# Patient Record
Sex: MALE | Race: BLACK OR AFRICAN AMERICAN | Employment: FULL TIME | URBAN - METROPOLITAN AREA
[De-identification: names, ages, dates, MRNs, and addresses within clinical notes are randomized per-mention and may not be internally consistent; named-entity substitution may affect disease eponyms.]

---

## 2018-06-16 PROBLEM — N41.1 CHRONIC PROSTATITIS: Status: ACTIVE | Noted: 2018-06-16

## 2018-06-16 PROBLEM — E78.00 HYPERCHOLESTEROLEMIA: Status: ACTIVE | Noted: 2018-06-16

## 2018-06-16 PROBLEM — R73.02 IGT (IMPAIRED GLUCOSE TOLERANCE): Status: ACTIVE | Noted: 2018-06-16

## 2018-06-16 PROBLEM — E66.3 OVERWEIGHT (BMI 25.0-29.9): Status: ACTIVE | Noted: 2018-06-16

## 2018-06-16 PROBLEM — R97.20 ELEVATED PSA, LESS THAN 10 NG/ML: Status: ACTIVE | Noted: 2018-06-16

## 2020-06-19 ENCOUNTER — ANESTHESIA EVENT (OUTPATIENT)
Dept: SURGERY | Age: 55
End: 2020-06-19
Payer: OTHER MISCELLANEOUS

## 2020-06-19 ENCOUNTER — HOSPITAL ENCOUNTER (OUTPATIENT)
Age: 55
Setting detail: OUTPATIENT SURGERY
Discharge: HOME | End: 2020-06-19
Attending: ORTHOPAEDIC SURGERY | Admitting: ORTHOPAEDIC SURGERY
Payer: OTHER MISCELLANEOUS

## 2020-06-19 ENCOUNTER — ANESTHESIA (OUTPATIENT)
Dept: SURGERY | Age: 55
End: 2020-06-19
Payer: OTHER MISCELLANEOUS

## 2020-06-19 VITALS
BODY MASS INDEX: 29.48 KG/M2 | WEIGHT: 229.6 LBS | RESPIRATION RATE: 16 BRPM | OXYGEN SATURATION: 98 % | TEMPERATURE: 97.7 F | SYSTOLIC BLOOD PRESSURE: 126 MMHG | DIASTOLIC BLOOD PRESSURE: 78 MMHG | HEART RATE: 55 BPM

## 2020-06-19 PROCEDURE — C1713 ANCHOR/SCREW BN/BN,TIS/BN: HCPCS | Performed by: ORTHOPAEDIC SURGERY

## 2020-06-19 PROCEDURE — 76010000160 HC OR TIME 0.5 TO 1 HR INTENSV-TIER 1: Performed by: ORTHOPAEDIC SURGERY

## 2020-06-19 PROCEDURE — 76210000020 HC REC RM PH II FIRST 0.5 HR: Performed by: ORTHOPAEDIC SURGERY

## 2020-06-19 PROCEDURE — 77030002933 HC SUT MCRYL J&J -A: Performed by: ORTHOPAEDIC SURGERY

## 2020-06-19 PROCEDURE — 74011250636 HC RX REV CODE- 250/636: Performed by: ORTHOPAEDIC SURGERY

## 2020-06-19 PROCEDURE — 77030019605: Performed by: ORTHOPAEDIC SURGERY

## 2020-06-19 PROCEDURE — 77030010509 HC AIRWY LMA MSK TELE -A: Performed by: ANESTHESIOLOGY

## 2020-06-19 PROCEDURE — 77030002960 HC SUT PASS S&N -C: Performed by: ORTHOPAEDIC SURGERY

## 2020-06-19 PROCEDURE — 74011250636 HC RX REV CODE- 250/636: Performed by: ANESTHESIOLOGY

## 2020-06-19 PROCEDURE — 77030041183 HC KT ACCESS POS ACUFX SN -B: Performed by: ORTHOPAEDIC SURGERY

## 2020-06-19 PROCEDURE — 77030027384 HC PRB ARTHSCP SERFAS STRY -C: Performed by: ORTHOPAEDIC SURGERY

## 2020-06-19 PROCEDURE — 74011250636 HC RX REV CODE- 250/636: Performed by: NURSE ANESTHETIST, CERTIFIED REGISTERED

## 2020-06-19 PROCEDURE — 74011250636 HC RX REV CODE- 250/636: Performed by: PHYSICIAN ASSISTANT

## 2020-06-19 PROCEDURE — 77030033005 HC TBNG ARTHSC PMP STRY -B: Performed by: ORTHOPAEDIC SURGERY

## 2020-06-19 PROCEDURE — 77030018836 HC SOL IRR NACL ICUM -A: Performed by: ORTHOPAEDIC SURGERY

## 2020-06-19 PROCEDURE — 77030040361 HC SLV COMPR DVT MDII -B: Performed by: ORTHOPAEDIC SURGERY

## 2020-06-19 PROCEDURE — 76010010054 HC POST OP PAIN BLOCK: Performed by: ORTHOPAEDIC SURGERY

## 2020-06-19 PROCEDURE — 74011000250 HC RX REV CODE- 250: Performed by: NURSE ANESTHETIST, CERTIFIED REGISTERED

## 2020-06-19 PROCEDURE — 77030006668 HC BLD SHV MENSCS STRY -B: Performed by: ORTHOPAEDIC SURGERY

## 2020-06-19 PROCEDURE — 76060000034 HC ANESTHESIA 1.5 TO 2 HR: Performed by: ORTHOPAEDIC SURGERY

## 2020-06-19 PROCEDURE — 76210000006 HC OR PH I REC 0.5 TO 1 HR: Performed by: ORTHOPAEDIC SURGERY

## 2020-06-19 PROCEDURE — 77030003666 HC NDL SPINAL BD -A: Performed by: ORTHOPAEDIC SURGERY

## 2020-06-19 PROCEDURE — 76942 ECHO GUIDE FOR BIOPSY: CPT | Performed by: ORTHOPAEDIC SURGERY

## 2020-06-19 PROCEDURE — 74011000250 HC RX REV CODE- 250: Performed by: ANESTHESIOLOGY

## 2020-06-19 PROCEDURE — 77030003602 HC NDL NRV BLK BBMI -B: Performed by: ANESTHESIOLOGY

## 2020-06-19 PROCEDURE — 74011250637 HC RX REV CODE- 250/637: Performed by: ANESTHESIOLOGY

## 2020-06-19 PROCEDURE — 77030004453 HC BUR SHV STRY -B: Performed by: ORTHOPAEDIC SURGERY

## 2020-06-19 PROCEDURE — 74011000250 HC RX REV CODE- 250: Performed by: PHYSICIAN ASSISTANT

## 2020-06-19 DEVICE — OMEGA 4.75MM PEEK KNOTLESS ANCHOR SYSTEM, SINGLE
Type: IMPLANTABLE DEVICE | Site: SHOULDER | Status: FUNCTIONAL
Brand: OMEGA

## 2020-06-19 RX ORDER — ONDANSETRON 2 MG/ML
INJECTION INTRAMUSCULAR; INTRAVENOUS AS NEEDED
Status: DISCONTINUED | OUTPATIENT
Start: 2020-06-19 | End: 2020-06-19 | Stop reason: HOSPADM

## 2020-06-19 RX ORDER — MIDAZOLAM HYDROCHLORIDE 1 MG/ML
2 INJECTION, SOLUTION INTRAMUSCULAR; INTRAVENOUS ONCE
Status: COMPLETED | OUTPATIENT
Start: 2020-06-19 | End: 2020-06-19

## 2020-06-19 RX ORDER — EPHEDRINE SULFATE/0.9% NACL/PF 50 MG/5 ML
SYRINGE (ML) INTRAVENOUS AS NEEDED
Status: DISCONTINUED | OUTPATIENT
Start: 2020-06-19 | End: 2020-06-19 | Stop reason: HOSPADM

## 2020-06-19 RX ORDER — FENTANYL CITRATE 50 UG/ML
100 INJECTION, SOLUTION INTRAMUSCULAR; INTRAVENOUS ONCE
Status: COMPLETED | OUTPATIENT
Start: 2020-06-19 | End: 2020-06-19

## 2020-06-19 RX ORDER — LIDOCAINE HYDROCHLORIDE 10 MG/ML
0.1 INJECTION INFILTRATION; PERINEURAL AS NEEDED
Status: DISCONTINUED | OUTPATIENT
Start: 2020-06-19 | End: 2020-06-19 | Stop reason: HOSPADM

## 2020-06-19 RX ORDER — OXYCODONE HYDROCHLORIDE 5 MG/1
5 TABLET ORAL
Status: DISCONTINUED | OUTPATIENT
Start: 2020-06-19 | End: 2020-06-19 | Stop reason: HOSPADM

## 2020-06-19 RX ORDER — MIDAZOLAM HYDROCHLORIDE 1 MG/ML
2 INJECTION, SOLUTION INTRAMUSCULAR; INTRAVENOUS ONCE
Status: DISCONTINUED | OUTPATIENT
Start: 2020-06-19 | End: 2020-06-19 | Stop reason: HOSPADM

## 2020-06-19 RX ORDER — GLYCOPYRROLATE 0.2 MG/ML
INJECTION INTRAMUSCULAR; INTRAVENOUS AS NEEDED
Status: DISCONTINUED | OUTPATIENT
Start: 2020-06-19 | End: 2020-06-19 | Stop reason: HOSPADM

## 2020-06-19 RX ORDER — OXYCODONE HYDROCHLORIDE 5 MG/1
10 TABLET ORAL
Status: DISCONTINUED | OUTPATIENT
Start: 2020-06-19 | End: 2020-06-19 | Stop reason: HOSPADM

## 2020-06-19 RX ORDER — SODIUM CHLORIDE, SODIUM LACTATE, POTASSIUM CHLORIDE, CALCIUM CHLORIDE 600; 310; 30; 20 MG/100ML; MG/100ML; MG/100ML; MG/100ML
75 INJECTION, SOLUTION INTRAVENOUS CONTINUOUS
Status: DISCONTINUED | OUTPATIENT
Start: 2020-06-19 | End: 2020-06-19 | Stop reason: HOSPADM

## 2020-06-19 RX ORDER — BUPIVACAINE HYDROCHLORIDE AND EPINEPHRINE 5; 5 MG/ML; UG/ML
INJECTION, SOLUTION EPIDURAL; INTRACAUDAL; PERINEURAL
Status: COMPLETED | OUTPATIENT
Start: 2020-06-19 | End: 2020-06-19

## 2020-06-19 RX ORDER — CEFAZOLIN SODIUM/WATER 2 G/20 ML
2 SYRINGE (ML) INTRAVENOUS ONCE
Status: COMPLETED | OUTPATIENT
Start: 2020-06-19 | End: 2020-06-19

## 2020-06-19 RX ORDER — FAMOTIDINE 20 MG/1
20 TABLET, FILM COATED ORAL ONCE
Status: COMPLETED | OUTPATIENT
Start: 2020-06-19 | End: 2020-06-19

## 2020-06-19 RX ORDER — DEXAMETHASONE SODIUM PHOSPHATE 4 MG/ML
INJECTION, SOLUTION INTRA-ARTICULAR; INTRALESIONAL; INTRAMUSCULAR; INTRAVENOUS; SOFT TISSUE AS NEEDED
Status: DISCONTINUED | OUTPATIENT
Start: 2020-06-19 | End: 2020-06-19 | Stop reason: HOSPADM

## 2020-06-19 RX ORDER — HYDROMORPHONE HYDROCHLORIDE 2 MG/ML
0.5 INJECTION, SOLUTION INTRAMUSCULAR; INTRAVENOUS; SUBCUTANEOUS
Status: DISCONTINUED | OUTPATIENT
Start: 2020-06-19 | End: 2020-06-19 | Stop reason: HOSPADM

## 2020-06-19 RX ORDER — EPINEPHRINE 1 MG/ML
INJECTION INTRAMUSCULAR; INTRAVENOUS; SUBCUTANEOUS AS NEEDED
Status: DISCONTINUED | OUTPATIENT
Start: 2020-06-19 | End: 2020-06-19 | Stop reason: HOSPADM

## 2020-06-19 RX ORDER — PROPOFOL 10 MG/ML
INJECTION, EMULSION INTRAVENOUS AS NEEDED
Status: DISCONTINUED | OUTPATIENT
Start: 2020-06-19 | End: 2020-06-19 | Stop reason: HOSPADM

## 2020-06-19 RX ORDER — LORAZEPAM 2 MG/ML
INJECTION INTRAMUSCULAR
Status: DISCONTINUED
Start: 2020-06-19 | End: 2020-06-19 | Stop reason: WASHOUT

## 2020-06-19 RX ORDER — IBUPROFEN 400 MG/1
400 TABLET ORAL ONCE
Status: DISCONTINUED | OUTPATIENT
Start: 2020-06-19 | End: 2020-06-19 | Stop reason: HOSPADM

## 2020-06-19 RX ORDER — LIDOCAINE HYDROCHLORIDE 20 MG/ML
INJECTION, SOLUTION EPIDURAL; INFILTRATION; INTRACAUDAL; PERINEURAL AS NEEDED
Status: DISCONTINUED | OUTPATIENT
Start: 2020-06-19 | End: 2020-06-19 | Stop reason: HOSPADM

## 2020-06-19 RX ADMIN — MIDAZOLAM 1 MG: 1 INJECTION INTRAMUSCULAR; INTRAVENOUS at 12:25

## 2020-06-19 RX ADMIN — CEFAZOLIN SODIUM 2 G: 100 INJECTION, POWDER, LYOPHILIZED, FOR SOLUTION INTRAVENOUS at 12:44

## 2020-06-19 RX ADMIN — LIDOCAINE HYDROCHLORIDE 100 MG: 20 INJECTION, SOLUTION EPIDURAL; INFILTRATION; INTRACAUDAL; PERINEURAL at 12:40

## 2020-06-19 RX ADMIN — GLYCOPYRROLATE 0.2 MG: 0.2 INJECTION, SOLUTION INTRAMUSCULAR; INTRAVENOUS at 13:17

## 2020-06-19 RX ADMIN — Medication 10 MG: at 13:13

## 2020-06-19 RX ADMIN — ONDANSETRON 4 MG: 2 INJECTION INTRAMUSCULAR; INTRAVENOUS at 12:59

## 2020-06-19 RX ADMIN — SODIUM CHLORIDE, SODIUM LACTATE, POTASSIUM CHLORIDE, AND CALCIUM CHLORIDE 75 ML/HR: 600; 310; 30; 20 INJECTION, SOLUTION INTRAVENOUS at 12:11

## 2020-06-19 RX ADMIN — FENTANYL CITRATE 50 MCG: 50 INJECTION, SOLUTION INTRAMUSCULAR; INTRAVENOUS at 12:24

## 2020-06-19 RX ADMIN — BUPIVACAINE HYDROCHLORIDE AND EPINEPHRINE BITARTRATE 30 ML: 5; .005 INJECTION, SOLUTION EPIDURAL; INTRACAUDAL; PERINEURAL at 12:31

## 2020-06-19 RX ADMIN — DEXAMETHASONE SODIUM PHOSPHATE 4 MG: 4 INJECTION, SOLUTION INTRAMUSCULAR; INTRAVENOUS at 13:00

## 2020-06-19 RX ADMIN — PROPOFOL 200 MG: 10 INJECTION, EMULSION INTRAVENOUS at 12:40

## 2020-06-19 RX ADMIN — FAMOTIDINE 20 MG: 20 TABLET, FILM COATED ORAL at 12:11

## 2020-06-19 NOTE — H&P
Outpatient Surgery History and Physical:  Darleen Lazar was seen and examined. CHIEF COMPLAINT:   Right shoulder pain. PE:     Visit Vitals  /76 (BP 1 Location: Left arm, BP Patient Position: At rest)   Pulse 83   Temp 98 °F (36.7 °C)   Resp 16   Wt 104.1 kg (229 lb 9.6 oz)   SpO2 97%   BMI 29.48 kg/m²       Heart:   Regular rhythm      Lungs:  Are clear      Past Medical History:    Patient Active Problem List    Diagnosis    IGT (impaired glucose tolerance)    Elevated PSA, less than 10 ng/ml    Overweight (BMI 25.0-29. 9)    Hypercholesterolemia    Chronic prostatitis       Surgical History:   Past Surgical History:   Procedure Laterality Date    HX ORTHOPAEDIC  late 1990's    \"torn ligament\" \"reconstruction\" both knees       Social History: Patient  reports that he has never smoked. He has never used smokeless tobacco. He reports current alcohol use of about 16.0 standard drinks of alcohol per week. He reports previous drug use. Drug: Marijuana. Family History:   Family History   Problem Relation Age of Onset    Heart Disease Mother        Allergies: Reviewed per EMR  No Known Allergies    Medications:    No current facility-administered medications on file prior to encounter. Current Outpatient Medications on File Prior to Encounter   Medication Sig    multivitamin (ONE A DAY) tablet Take 1 Tab by mouth daily. The surgery is planned for the right shoulder arthroscopy rotator cuff repair. History and physical has been reviewed. The patient has been examined. There have been no significant clinical changes since the completion of the originally dated History and Physical.  Patient identified by surgeon; surgical site was confirmed by patient and surgeon. The patient is here today for outpatient surgery. I have examined the patient, no changes are noted in the patient's medical status.  Necessity for the procedure/care is still present and the history and physical above is current. See the office notes for the full long term history of the problem. Please see the recent office notes for the musculoskeletal examination.     Signed By: ORLY Abdullahi     June 19, 2020 11:33 AM

## 2020-06-19 NOTE — ANESTHESIA PROCEDURE NOTES
Peripheral Block    Start time: 6/19/2020 12:23 PM  End time: 6/19/2020 12:27 PM  Performed by: Jhoana Summers MD  Authorized by: Jhoana Summers MD       Pre-procedure: Indications: at surgeon's request, post-op pain management and procedure for pain    Preanesthetic Checklist: patient identified, risks and benefits discussed, site marked, timeout performed, anesthesia consent given and patient being monitored    Timeout Time: 12:23          Block Type:   Block Type:   Interscalene  Laterality:  Right  Monitoring:  Standard ASA monitoring, continuous pulse ox, frequent vital sign checks, heart rate, oxygen and responsive to questions  Injection Technique:  Single shot  Procedures: ultrasound guided and nerve stimulator    Patient Position: supine  Prep: chlorhexidine    Location:  Interscalene  Needle Type:  Stimuplex  Needle Gauge:  21 G  Needle Localization:  Ultrasound guidance and anatomical landmarks  Motor Response: minimal motor response >0.4 mA      Assessment:  Number of attempts:  1  Injection Assessment:  Incremental injection every 5 mL, local visualized surrounding nerve on ultrasound, negative aspiration for blood, negative aspiration for CSF, no paresthesia, no intravascular symptoms and ultrasound image on chart  Patient tolerance:  Patient tolerated the procedure well with no immediate complications

## 2020-06-19 NOTE — OP NOTES
[address] [place] [address]  OPERATIVE REPORT    Name:  Rodger Pak  MR#:  [id number]  :  1965  ACCOUNT #:  [de-identified]  DATE OF SERVICE:  2020    PREOPERATIVE DIAGNOSES:  1. Rotator cuff tear. 2.  Chondromalacia of glenoid  3. Chondromalacia of humeral head. 4.  Impingement, right shoulder. POSTOPERATIVE DIAGNOSES:  1. Rotator cuff tear. 2.  Chondromalacia of glenoid  3. Chondromalacia of humeral head. 4.  Impingement, right shoulder. PROCEDURE PERFORMED:  1. Arthroscopic rotator cuff repair - CPT [id number].  2.  Chondroplasty of glenoid - extensive debridement - CPT 80909.  3.  Chondroplasty of humeral head - extensive debridement - CPT 99876.  4.  Arthroscopic subacromial decompression - CPT 33469, right shoulder. SURGEON:  Lisa Collado MD    ASSISTANT:  Yarely Patel. [facility]    ANESTHESIA:  General.    COMPLICATIONS:  None. SPECIMENS REMOVED:  None. IMPLANTS:  Yes, see record. ESTIMATED BLOOD LOSS:  Minimal.    FLUIDS:  Crystalloid. FINDINGS:  There was a high-grade partial tear of the supraspinatus encompassing approximately 9-10 mm. This was greater than 50% thickness of the tendon. There was significant chondromalacia of the glenoid and humeral head with large areas of exposed bone on both surfaces. There was some synovitis and some small loose bodies in the glenohumeral joint. There was an anterior-inferior acromial slope. There was no undersurface osteophytes of the distal clavicle or evidence of impingement on the rotator cuff from the distal clavicle. DESCRIPTION OF PROCEDURE:  After informed consent, the patient was brought to the operating room and placed on the operating room table in the supine position. General anesthesia was administered without difficulty. The patient was placed in lateral decubitus position. All pressure points were inspected and padded appropriately. Right upper extremity was suspended by traction.   Right shoulder was prepped and draped in sterile fashion. Glenohumeral joint was insufflated with 50 mL of sterile saline and a posterior portal was established. Glenohumeral joint was then arthroscoped in sequential manner. The aforementioned findings were noted. An anterior portal was established. Synovectomy was performed. All abnormal tissue was removed. Small osteochondral loose bodies were removed from the shoulder joint. A chondroplasty of the glenoid was performed. All loose cartilage flaps were removed. There were no sharp edges or unstable fragments after the chondroplasty. In a similar manner, a chondroplasty of humeral head was performed. All loose cartilage flaps were removed. There were no sharp edges or unstable fragments after the chondroplasty. Undersurface rotator cuff tearing was debrided back to smooth stable area. The high-grade partial tear was marked with a spinal needle. Scope was brought to subacromial space. A lateral portal was established. Bursal proliferative tissue was excised. The undersurface of the acromion was exposed and an acromioplasty was performed. All of the acromion anterior to the leading edge of the distal clavicle was excised. A depth of 5-6 mm in a 2-cm anterior to posterior direction was removed. This opened up the subacromial space nicely. Attention was then directed to the rotator cuff. The high-grade partial tear was converted to a full-thickness tear. The area underneath the original insertion was lightly decorticated. An Omega anchor and two tape sutures were used to anatomically repair the tear in a mattress fashion. Complete repair of the rotator cuff was performed. The rotator cuff tear was repaired without difficulty. Shoulder was thoroughly irrigated. Portals were closed. Dressings were applied. The patient was extubated and taken to the recovery room in stable condition. ORLY Cade assisted during the procedure.   He was necessary for patient positioning, wound closure and assistance with the major portions of the operation,  His presence decreased the operative time and potential complication rate.       MD YUE [email or web address]  D:  06/19/2020 13:45  T:  06/19/2020 14:36  JOB #:  [id number]

## 2020-06-19 NOTE — ANESTHESIA PREPROCEDURE EVALUATION
Relevant Problems   No relevant active problems       Anesthetic History               Review of Systems / Medical History  Patient summary reviewed and pertinent labs reviewed    Pulmonary                   Neuro/Psych              Cardiovascular                  Exercise tolerance: >4 METS     GI/Hepatic/Renal                Endo/Other             Other Findings              Physical Exam    Airway  Mallampati: II  TM Distance: > 6 cm  Neck ROM: normal range of motion   Mouth opening: Normal     Cardiovascular  Regular rate and rhythm,  S1 and S2 normal,  no murmur, click, rub, or gallop  Rhythm: regular  Rate: normal         Dental  No notable dental hx       Pulmonary  Breath sounds clear to auscultation               Abdominal         Other Findings            Anesthetic Plan    ASA: 1  Anesthesia type: general      Post-op pain plan if not by surgeon: peripheral nerve block single      Anesthetic plan and risks discussed with: Patient

## 2020-06-19 NOTE — DISCHARGE INSTRUCTIONS
Post-Operative Instructions   For  Shoulder Arthroscopy Rotator Cuff Repair  Phone:  [phone]    1. If you do not have an \"Iceman\" type cooling unit, for the first 48-72 hours following surgery, use ice on the shoulder every two hours (while awake) for 20-30 minutes at a time to help prevent swelling and lessen pain. If you have a cooling unit, follow the instructions given to you- continually as much as possible the first 48-72 hours, then 3-4 times a day for 4 weeks. 2. You may shower after the arthroscopy. Keep dressings in place for the first three days. After three days, you may remove the dressings and leave the steri-strip bandages in place; they will peel off naturally. 3. Stay in sling at all times except to shower. 4. Begin therapy as ordered. 5. Use any pain medication as instructed. You should take your pain medication as soon as you feel the anesthetic wearing off. Do not wait until you are in severe pain to begin taking your pain medication. 6. You may have some side effects from your pain medication. If you have nausea, try taking your medication with food. For itching, you may take over the counter Benadryl. 7. You may have been given a prescription for Zofran or Phenergan. This medication is used for nausea and vomiting. You do not need to get this prescription filled unless you have a problem. 8. If you have a problem, please call [facility] at [phone]    [address] [place] [address], [facility]     TYPICAL SIDE EFFECTS OF PAIN MEDICATION:  *    Constipation: Drink lots of fluids. Over the counter stool softener if needed. *    Nausea: Take pain medication with food. Call your doctor with persistent nausea. ACTIVITY  · As tolerated and as directed by your doctor. · Bathe or shower as directed by your doctor.      DIET  · Day of surgery: Clear liquids until no nausea or vomiting; small portion, light diet Deb Barrios foods (ex: baked chicken, plain rice, grits, scrambled eggs, toast). Nothing greasy, fried or spicy today. · Advance to regular diet on second day, unless your doctor orders otherwise. · If nausea and vomiting continues, call your doctor. PAIN  · Take pain medication as directed by your doctor. · DO NOT take aspirin or blood thinners unless directed by your doctor. CALL YOUR DOCTOR IF    s Call your doctor if pain is NOT relieved by medication.   s Excessive bleeding that does not stop after holding pressure over the area  · Temperature of 101 degrees F or above  · Excessive redness, swelling or bruising, and/ or green or yellow, smelly discharge from incision    AFTER ANESTHESIA   · For the first 24 hours: DO NOT Drive, Drink alcoholic beverages, or Make important decisions. · Be aware of dizziness following anesthesia and while taking pain medication. DISCHARGE SUMMARY from Nurse    PATIENT INSTRUCTIONS:    After general anesthesia or intravenous sedation, for 24 hours or while taking prescription Narcotics:  · Limit your activities  · Do not drive and operate hazardous machinery  · Do not make important personal or business decisions  · Do  not drink alcoholic beverages  · If you have not urinated within 8 hours after discharge, please contact your surgeon on call. *  Please give a list of your current medications to your Primary Care Provider. *  Please update this list whenever your medications are discontinued, doses are      changed, or new medications (including over-the-counter products) are added. *  Please carry medication information at all times in case of emergency situations. Preventing Infection at Home  We care about preventing infection and avoiding the spread of germs - not only when you are in the hospital but also when you return home.  When you return home from the hospital, its important to take the following steps to help prevent infection and avoid spreading germs that could infect you and others. Ask everyone in your home to follow these guidelines, too. Clean Your Hands  · Clean your hands whenever your hands are visibly dirty, before you eat, before or after touching your mouth, nose or eyes, and before preparing food. Clean them after contact with body fluids, using the restroom, touching animals or changing diapers. · When washing hands, wet them with warm water and work up a lather. Rub hands for at least 15 seconds, then rinse them and pat them dry with a clean towel or paper towel. · When using hand sanitizers, it should take about 15 seconds to rub your hands dry. If not, you probably didnt apply enough . Cover Your Sneeze or Cough  Germs are released into the air whenever you sneeze or cough. To prevent the spread of infection:  · Turn away from other people before coughing or sneezing. · Cover your mouth or nose with a tissue when you cough or sneeze. Put the tissue in the trash. · If you dont have a tissue, cough or sneeze into your upper sleeve, not your hands. · Always clean your hands after coughing or sneezing. Care for Wounds  Your skin is your bodys first line of defense against germs, but an open wound leaves an easy way for germs to enter your body. To prevent infection:  · Clean your hands before and after changing wound dressings, and wear gloves to change dressings if recommended by your doctor. · Take special care with IV lines or other devices inserted into the body. If you must touch them, clean your hands first.  · Follow any specific instructions from your doctor to care for your wounds. Contact your doctor if you experience any signs of infection, such as fever or increased redness at the surgical or wound site. Keep a Clean Home  · Clean or wipe commonly touched hard surfaces like door handles, sinks, tabletops, phones and TV remotes.   · Use products labeled disinfectant to kill harmful bacteria and viruses. · Use a clean cloth or paper towel to clean and dry surfaces. Wiping surfaces with a dirty dishcloth, sponge or towel will only spread germs. · Never share toothbrushes, lazo, drinking glasses, utensils, razor blades, face cloths or bath towels to avoid spreading germs. · Be sure that the linens that you sleep on are clean. · Keep pets away from wounds and wash your hands after touching pets, their toys or bedding. We care about you and your health. Remember, preventing infections is a team effort between you, your family, friends and health care providers. These are general instructions for a healthy lifestyle:    No smoking/ No tobacco products/ Avoid exposure to second hand smoke    Surgeon General's Warning:  Quitting smoking now greatly reduces serious risk to your health. Obesity, smoking, and sedentary lifestyle greatly increases your risk for illness    A healthy diet, regular physical exercise & weight monitoring are important for maintaining a healthy lifestyle    You may be retaining fluid if you have a history of heart failure or if you experience any of the following symptoms:  Weight gain of 3 pounds or more overnight or 5 pounds in a week, increased swelling in our hands or feet or shortness of breath while lying flat in bed. Please call your doctor as soon as you notice any of these symptoms; do not wait until your next office visit. Recognize signs and symptoms of STROKE:    F-face looks uneven    A-arms unable to move or move unevenly    S-speech slurred or non-existent    T-time-call 911 as soon as signs and symptoms begin-DO NOT go       Back to bed or wait to see if you get better-TIME IS BRAIN. Advance Care Planning  People with COVID-19 may have no symptoms, mild symptoms, such as fever, cough, and shortness of breath or they may have more severe illness, developing severe and fatal pneumonia.   As a result, Advance Care Planning with attention to naming a health care decision maker (someone you trust to make healthcare decisions for you if you could not speak for yourself) and sharing other health care preferences is important BEFORE a possible health crisis. Please contact your Primary Care Provider to discuss Advance Care Planning. Preventing the Spread of Coronavirus Disease 2019 in Homes and Residential Communities  For the most recent information go to [employer]    Prevention steps for People with confirmed or suspected COVID-19 (including persons under investigation) who do not need to be hospitalized  and   People with confirmed COVID-19 who were hospitalized and determined to be medically stable to go home    Your healthcare provider and public health staff will evaluate whether you can be cared for at home. If it is determined that you do not need to be hospitalized and can be isolated at home, you will be monitored by staff from your local or state health department. You should follow the prevention steps below until a healthcare provider or local or state health department says you can return to your normal activities. Stay home except to get medical care  People who are mildly ill with COVID-19 are able to isolate at home during their illness. You should restrict activities outside your home, except for getting medical care. Do not go to work, school, or public areas. Avoid using public transportation, ride-sharing, or taxis. Separate yourself from other people and animals in your home  People: As much as possible, you should stay in a specific room and away from other people in your home. Also, you should use a separate bathroom, if available. Animals: You should restrict contact with pets and other animals while you are sick with COVID-19, just like you would around other people.  Although there have not been reports of pets or other animals becoming sick with COVID-19, it is still recommended that people sick with COVID-19 limit contact with animals until more information is known about the virus. When possible, have another member of your household care for your animals while you are sick. If you are sick with COVID-19, avoid contact with your pet, including petting, snuggling, being kissed or licked, and sharing food. If you must care for your pet or be around animals while you are sick, wash your hands before and after you interact with pets and wear a facemask. Call ahead before visiting your doctor  If you have a medical appointment, call the healthcare provider and tell them that you have or may have COVID-19. This will help the healthcare providers office take steps to keep other people from getting infected or exposed. Wear a facemask  You should wear a facemask when you are around other people (e.g., sharing a room or vehicle) or pets and before you enter a healthcare providers office. If you are not able to wear a facemask (for example, because it causes trouble breathing), then people who live with you should not stay in the same room with you, or they should wear a facemask if they enter your room. Cover your coughs and sneezes  Cover your mouth and nose with a tissue when you cough or sneeze. Throw used tissues in a lined trash can. Immediately wash your hands with soap and water for at least 20 seconds or, if soap and water are not available, clean your hands with an alcohol-based hand  that contains at least 60% alcohol. Clean your hands often  Wash your hands often with soap and water for at least 20 seconds, especially after blowing your nose, coughing, or sneezing; going to the bathroom; and before eating or preparing food. If soap and water are not readily available, use an alcohol-based hand  with at least 60% alcohol, covering all surfaces of your hands and rubbing them together until they feel dry.   Soap and water are the best option if hands are visibly dirty. Avoid touching your eyes, nose, and mouth with unwashed hands. Avoid sharing personal household items  You should not share dishes, drinking glasses, cups, eating utensils, towels, or bedding with other people or pets in your home. After using these items, they should be washed thoroughly with soap and water. Clean all high-touch surfaces everyday  High touch surfaces include counters, tabletops, doorknobs, bathroom fixtures, toilets, phones, keyboards, tablets, and bedside tables. Also, clean any surfaces that may have blood, stool, or body fluids on them. Use a household cleaning spray or wipe, according to the label instructions. Labels contain instructions for safe and effective use of the cleaning product including precautions you should take when applying the product, such as wearing gloves and making sure you have good ventilation during use of the product. Monitor your symptoms  Seek prompt medical attention if your illness is worsening (e.g., difficulty breathing). Before seeking care, call your healthcare provider and tell them that you have, or are being evaluated for, COVID-19. Put on a facemask before you enter the facility. These steps will help the healthcare providers office to keep other people in the office or waiting room from getting infected or exposed. Ask your healthcare provider to call the local or state health department. Persons who are placed under active monitoring or facilitated self-monitoring should follow instructions provided by their local health department or occupational health professionals, as appropriate. When working with your local health department check their available hours. If you have a medical emergency and need to call 911, notify the dispatch personnel that you have, or are being evaluated for COVID-19. If possible, put on a facemask before emergency medical services arrive.   Discontinuing home isolation  Patients with confirmed COVID-19 should remain under home isolation precautions until the risk of secondary transmission to others is thought to be low. The decision to discontinue home isolation precautions should be made on a case-by-case basis, in consultation with healthcare providers and state and local health departments.

## 2020-06-19 NOTE — ANESTHESIA POSTPROCEDURE EVALUATION
Procedure(s):  RIGHT SHOULDER ARTHROSCOPY ROTATOR CUFF REPAIR GEN/SCAL.     general    Anesthesia Post Evaluation      Multimodal analgesia: multimodal analgesia not used between 6 hours prior to anesthesia start to PACU discharge  Patient location during evaluation: PACU  Patient participation: complete - patient participated  Level of consciousness: awake and alert  Pain management: adequate  Airway patency: patent  Anesthetic complications: no  Cardiovascular status: hemodynamically stable  Respiratory status: acceptable  Hydration status: acceptable        INITIAL Post-op Vital signs:   Vitals Value Taken Time   /81 6/19/2020  2:43 PM   Temp 36.4 °C (97.5 °F) 6/19/2020  2:10 PM   Pulse 51 6/19/2020  2:43 PM   Resp 15 6/19/2020  2:28 PM   SpO2 96 % 6/19/2020  2:43 PM

## 2020-06-19 NOTE — PERIOP NOTES
PACU DISCHARGE NOTE  Patient complaint of headache. Dr Cherene Kehr aware and gave new orders. See MAR. Patient declined medication for headache. Vital signs stable, pain well controlled, alert and oriented times three or at baseline, no anesthetic complications. IV removed with catheter tip intact. Written and verbal discharge instructions given, including pain control, dressing care and follow up appointment. Spouse, Mandie Hairston verbalized understanding and signed discharge instructions. All questions answered prior to discharge. Dr Ravinder Abreu okay to discharge at this time. Pt and all belongings taken via wheelchair and safely put in vehicle.

## 2020-08-22 ENCOUNTER — HOSPITAL ENCOUNTER (EMERGENCY)
Age: 55
Discharge: HOME | End: 2020-08-22
Attending: EMERGENCY MEDICINE

## 2020-08-22 ENCOUNTER — APPOINTMENT (OUTPATIENT)
Dept: GENERAL RADIOLOGY | Age: 55
End: 2020-08-22
Attending: EMERGENCY MEDICINE

## 2020-08-22 VITALS
BODY MASS INDEX: 28.6 KG/M2 | HEART RATE: 61 BPM | HEIGHT: 75 IN | TEMPERATURE: 99 F | WEIGHT: 230 LBS | SYSTOLIC BLOOD PRESSURE: 126 MMHG | OXYGEN SATURATION: 98 % | RESPIRATION RATE: 16 BRPM | DIASTOLIC BLOOD PRESSURE: 75 MMHG

## 2020-08-22 DIAGNOSIS — R11.2 NON-INTRACTABLE VOMITING WITH NAUSEA, UNSPECIFIED VOMITING TYPE: Primary | ICD-10-CM

## 2020-08-22 LAB
ALBUMIN SERPL-MCNC: 4.3 G/DL (ref 3.5–5)
ALBUMIN/GLOB SERPL: 1 {RATIO} (ref 1.2–3.5)
ALP SERPL-CCNC: 93 U/L (ref 50–136)
ALT SERPL-CCNC: 36 U/L (ref 12–65)
ANION GAP SERPL CALC-SCNC: 6 MMOL/L (ref 7–16)
AST SERPL-CCNC: 16 U/L (ref 15–37)
BASOPHILS # BLD: 0.1 K/UL (ref 0–0.2)
BASOPHILS NFR BLD: 1 % (ref 0–2)
BILIRUB SERPL-MCNC: 0.7 MG/DL (ref 0.2–1.1)
BUN SERPL-MCNC: 15 MG/DL (ref 6–23)
CALCIUM SERPL-MCNC: 10.5 MG/DL (ref 8.3–10.4)
CHLORIDE SERPL-SCNC: 107 MMOL/L (ref 98–107)
CO2 SERPL-SCNC: 27 MMOL/L (ref 21–32)
CREAT SERPL-MCNC: 1.29 MG/DL (ref 0.8–1.5)
DIFFERENTIAL METHOD BLD: NORMAL
EOSINOPHIL # BLD: 0.1 K/UL (ref 0–0.8)
EOSINOPHIL NFR BLD: 1 % (ref 0.5–7.8)
ERYTHROCYTE [DISTWIDTH] IN BLOOD BY AUTOMATED COUNT: 13.3 % (ref 11.9–14.6)
GLOBULIN SER CALC-MCNC: 4.3 G/DL (ref 2.3–3.5)
GLUCOSE SERPL-MCNC: 108 MG/DL (ref 65–100)
HCT VFR BLD AUTO: 48.7 % (ref 41.1–50.3)
HGB BLD-MCNC: 16.3 G/DL (ref 13.6–17.2)
IMM GRANULOCYTES # BLD AUTO: 0 K/UL (ref 0–0.5)
IMM GRANULOCYTES NFR BLD AUTO: 0 % (ref 0–5)
LIPASE SERPL-CCNC: 181 U/L (ref 73–393)
LYMPHOCYTES # BLD: 1.6 K/UL (ref 0.5–4.6)
LYMPHOCYTES NFR BLD: 22 % (ref 13–44)
MCH RBC QN AUTO: 29.8 PG (ref 26.1–32.9)
MCHC RBC AUTO-ENTMCNC: 33.5 G/DL (ref 31.4–35)
MCV RBC AUTO: 89 FL (ref 79.6–97.8)
MONOCYTES # BLD: 0.6 K/UL (ref 0.1–1.3)
MONOCYTES NFR BLD: 9 % (ref 4–12)
NEUTS SEG # BLD: 4.6 K/UL (ref 1.7–8.2)
NEUTS SEG NFR BLD: 66 % (ref 43–78)
NRBC # BLD: 0 K/UL (ref 0–0.2)
PLATELET # BLD AUTO: 330 K/UL (ref 150–450)
PMV BLD AUTO: 10.1 FL (ref 9.4–12.3)
POTASSIUM SERPL-SCNC: 3.6 MMOL/L (ref 3.5–5.1)
PROT SERPL-MCNC: 8.6 G/DL (ref 6.3–8.2)
RBC # BLD AUTO: 5.47 M/UL (ref 4.23–5.6)
SODIUM SERPL-SCNC: 140 MMOL/L (ref 136–145)
WBC # BLD AUTO: 6.9 K/UL (ref 4.3–11.1)

## 2020-08-22 PROCEDURE — 74022 RADEX COMPL AQT ABD SERIES: CPT

## 2020-08-22 PROCEDURE — 83690 ASSAY OF LIPASE: CPT

## 2020-08-22 PROCEDURE — 74011250636 HC RX REV CODE- 250/636: Performed by: EMERGENCY MEDICINE

## 2020-08-22 PROCEDURE — 74011000250 HC RX REV CODE- 250: Performed by: EMERGENCY MEDICINE

## 2020-08-22 PROCEDURE — 96374 THER/PROPH/DIAG INJ IV PUSH: CPT

## 2020-08-22 PROCEDURE — 80053 COMPREHEN METABOLIC PANEL: CPT

## 2020-08-22 PROCEDURE — 96375 TX/PRO/DX INJ NEW DRUG ADDON: CPT

## 2020-08-22 PROCEDURE — 99282 EMERGENCY DEPT VISIT SF MDM: CPT

## 2020-08-22 PROCEDURE — 85025 COMPLETE CBC W/AUTO DIFF WBC: CPT

## 2020-08-22 RX ORDER — FAMOTIDINE 40 MG/1
40 TABLET, FILM COATED ORAL DAILY
Qty: 30 TAB | Refills: 0 | Status: SHIPPED | OUTPATIENT
Start: 2020-08-22 | End: 2021-08-20

## 2020-08-22 RX ORDER — DICYCLOMINE HYDROCHLORIDE 10 MG/1
10 CAPSULE ORAL 4 TIMES DAILY
Qty: 20 CAP | Refills: 0 | Status: SHIPPED | OUTPATIENT
Start: 2020-08-22 | End: 2020-08-27

## 2020-08-22 RX ORDER — ONDANSETRON 4 MG/1
4 TABLET, ORALLY DISINTEGRATING ORAL
Qty: 12 TAB | Refills: 0 | Status: SHIPPED | OUTPATIENT
Start: 2020-08-22 | End: 2022-05-13

## 2020-08-22 RX ORDER — PROMETHAZINE HYDROCHLORIDE 25 MG/1
25 TABLET ORAL
Qty: 12 TAB | Refills: 0 | Status: SHIPPED | OUTPATIENT
Start: 2020-08-22 | End: 2022-05-13

## 2020-08-22 RX ORDER — ONDANSETRON 2 MG/ML
4 INJECTION INTRAMUSCULAR; INTRAVENOUS ONCE
Status: COMPLETED | OUTPATIENT
Start: 2020-08-22 | End: 2020-08-22

## 2020-08-22 RX ADMIN — SODIUM CHLORIDE 1000 ML: 900 INJECTION, SOLUTION INTRAVENOUS at 12:07

## 2020-08-22 RX ADMIN — FAMOTIDINE 40 MG: 10 INJECTION INTRAVENOUS at 12:07

## 2020-08-22 RX ADMIN — ONDANSETRON 4 MG: 2 INJECTION INTRAMUSCULAR; INTRAVENOUS at 12:06

## 2020-08-22 NOTE — ED NOTES
I have reviewed discharge instructions with the patient. The patient verbalized understanding. Patient left ED via Discharge Method: ambulatory to Home with spouse  Opportunity for questions and clarification provided. Patient given 4 scripts. No e-sign        To continue your aftercare when you leave the hospital, you may receive an automated call from our care team to check in on how you are doing. This is a free service and part of our promise to provide the best care and service to meet your aftercare needs.  If you have questions, or wish to unsubscribe from this service please call [phone]. Thank you for Choosing our [facility] Emergency Department.

## 2020-08-22 NOTE — ED PROVIDER NOTES
The history is provided by the patient. Abdominal Pain    This is a new problem. The current episode started more than 2 days ago. The problem occurs constantly. The problem has not changed since onset. The pain is associated with previous surgery. The pain is located in the generalized abdominal region. The quality of the pain is aching. The pain is at a severity of 3/10. The pain is mild. Associated symptoms include nausea and vomiting. Pertinent negatives include no anorexia, no fever, no belching, no diarrhea, no flatus, no hematochezia, no melena, no constipation, no dysuria, no frequency, no hematuria, no headaches, no arthralgias, no myalgias, no trauma, no chest pain, no testicular pain and no back pain. Nothing worsens the pain. The pain is relieved by nothing. Past workup includes no CT scan, no ultrasound, no surgery, no esophagogastroduodenoscopy, no UGI, no colonoscopy and no barium enema. His past medical history does not include PUD, gallstones, GERD, ulcerative colitis, Crohn's disease, irritable bowel syndrome, cancer, UTI, pancreatitis, ectopic pregnancy, ovarian cysts, diverticulitis, atrial fibrillation, DM, kidney stones or small bowel obstruction. The patient's surgical history non-contributory.        Past Medical History:   Diagnosis Date    Arthritis        Past Surgical History:   Procedure Laterality Date    HX ORTHOPAEDIC  late 1990's    \"torn ligament\" \"reconstruction\" both knees         Family History:   Problem Relation Age of Onset    Heart Disease Mother        Social History     Socioeconomic History    Marital status:      Spouse name: Not on file    Number of children: Not on file    Years of education: Not on file    Highest education level: Not on file   Occupational History    Not on file   Social Needs    Financial resource strain: Not on file    Food insecurity     Worry: Not on file     Inability: Not on file    Transportation needs     Medical: Not on file Non-medical: Not on file   Tobacco Use    Smoking status: Never Smoker    Smokeless tobacco: Never Used   Substance and Sexual Activity    Alcohol use: Yes     Alcohol/week: 16.0 standard drinks     Types: 14 Cans of beer, 2 Shots of liquor per week    Drug use: Not Currently     Types: Marijuana     Comment: last use approximately one month ago    Sexual activity: Not on file   Lifestyle    Physical activity     Days per week: Not on file     Minutes per session: Not on file    Stress: Not on file   Relationships    Social connections     Talks on phone: Not on file     Gets together: Not on file     Attends [employer] service: Not on file     Active member of club or organization: Not on file     Attends meetings of clubs or organizations: Not on file     Relationship status: Not on file    Intimate partner violence     Fear of current or ex partner: Not on file     Emotionally abused: Not on file     Physically abused: Not on file     Forced sexual activity: Not on file   Other Topics Concern    Not on file   Social History Narrative    Not on file         ALLERGIES: Patient has no known allergies. Review of Systems   Constitutional: Negative for fever. Cardiovascular: Negative for chest pain. Gastrointestinal: Positive for abdominal pain, nausea and vomiting. Negative for anorexia, constipation, diarrhea, flatus, hematochezia and melena. Genitourinary: Negative for dysuria, frequency, hematuria and testicular pain. Musculoskeletal: Negative for arthralgias, back pain and myalgias. Neurological: Negative for headaches. All other systems reviewed and are negative. Vitals:    08/22/20 1155   BP: 118/82   Resp: 16   Temp: 99 °F (37.2 °C)   SpO2: 98%   Weight: 104.3 kg (230 lb)   Height: 6' 3\" (1.905 m)            Physical Exam  Vitals signs and nursing note reviewed. Constitutional:       Appearance: He is well-developed. HENT:      Head: Normocephalic and atraumatic.    Eyes: Conjunctiva/sclera: Conjunctivae normal.      Pupils: Pupils are equal, round, and reactive to light. Neck:      Musculoskeletal: Normal range of motion and neck supple. Cardiovascular:      Rate and Rhythm: Normal rate and regular rhythm. Heart sounds: Normal heart sounds. Pulmonary:      Effort: Pulmonary effort is normal.      Breath sounds: Normal breath sounds. Abdominal:      General: Bowel sounds are normal.      Palpations: Abdomen is soft. Musculoskeletal: Normal range of motion. General: No deformity. Comments: Right arm in a sling   Skin:     General: Skin is warm and dry. Neurological:      Mental Status: He is alert and oriented to person, place, and time. Cranial Nerves: No cranial nerve deficit. Psychiatric:         Behavior: Behavior normal.          MDM  Number of Diagnoses or Management Options  Non-intractable vomiting with nausea, unspecified vomiting type:   Diagnosis management comments: 76-year-old male presenting for abdominal discomfort, cramping, nausea poor intake. Patient has normal vital signs and has a benign abdomen. We will get basic labs and an abdominal series x-ray.        Amount and/or Complexity of Data Reviewed  Clinical lab tests: ordered and reviewed (Results for orders placed or performed during the hospital encounter of 08/22/20  -CBC WITH AUTOMATED DIFF       Result                      Value             Ref Range           WBC                         6.9               4.3 - 11.1 K*       RBC                         5.47              4.23 - 5.6 M*       HGB                         16.3              13.6 - 17.2 *       HCT                         48.7              41.1 - 50.3 %       MCV                         89.0              79.6 - 97.8 *       MCH                         29.8              26.1 - 32.9 *       MCHC                        33.5              31.4 - 35.0 *       RDW                         13.3              11.9 - 14.6 %       PLATELET                    330               150 - 450 K/*       MPV                         10.1              9.4 - 12.3 FL       ABSOLUTE NRBC               0.00              0.0 - 0.2 K/*       DF                          AUTOMATED                             NEUTROPHILS                 66                43 - 78 %           LYMPHOCYTES                 22                13 - 44 %           MONOCYTES                   9                 4.0 - 12.0 %        EOSINOPHILS                 1                 0.5 - 7.8 %         BASOPHILS                   1                 0.0 - 2.0 %         IMMATURE GRANULOCYTES       0                 0.0 - 5.0 %         ABS. NEUTROPHILS            4.6               1.7 - 8.2 K/*       ABS. LYMPHOCYTES            1.6               0.5 - 4.6 K/*       ABS. MONOCYTES              0.6               0.1 - 1.3 K/*       ABS. EOSINOPHILS            0.1               0.0 - 0.8 K/*       ABS. BASOPHILS              0.1               0.0 - 0.2 K/*       ABS. IMM.  GRANS.            0.0               0.0 - 0.5 K/*  -METABOLIC PANEL, COMPREHENSIVE       Result                      Value             Ref Range           Sodium                      140               136 - 145 mm*       Potassium                   3.6               3.5 - 5.1 mm*       Chloride                    107               98 - 107 mmo*       CO2                         27                21 - 32 mmol*       Anion gap                   6 (L)             7 - 16 mmol/L       Glucose                     108 (H)           65 - 100 mg/*       BUN                         15                6 - 23 MG/DL        Creatinine                  1.29              0.8 - 1.5 MG*       GFR est AA                  >60               >60 ml/min/1*       GFR est non-AA              >60               >60 ml/min/1*       Calcium                     10.5 (H)          8.3 - 10.4 M*       Bilirubin, total            0.7               0.2 - 1.1 MG*       ALT (SGPT)                  36                12 - 65 U/L         AST (SGOT)                  16                15 - 37 U/L         Alk. phosphatase            93                50 - 136 U/L        Protein, total              8.6 (H)           6.3 - 8.2 g/*       Albumin                     4.3               3.5 - 5.0 g/*       Globulin                    4.3 (H)           2.3 - 3.5 g/*       A-G Ratio                   1.0 (L)           1.2 - 3.5      -LIPASE       Result                      Value             Ref Range           Lipase                      181               73 - 393 U/L   )  Tests in the radiology section of CPT®: ordered and reviewed (Xr Abd Acute W 1 V Chest    Result Date: 8/22/2020  Clinical History: The Male patient is 49years old  presenting with symptoms of constipation. Comparison:  None Findings: The lungs are free of acute infiltrate. There is no pleural effusion or pneumothorax. The cardiomediastinal silhouette is within normal limits. There is no acute osseous abnormality. The bowel gas pattern is nonspecific. There is no soft tissue mass or organomegaly. No abnormal calcifications are identified. There is no free intraperitoneal air. No acute osseous abnormality is demonstrated. Impression: 1. No acute cardiopulmonary disease. 2.   No free air or bowel obstruction. CPT code(s) 61122,92993     )  Independent visualization of images, tracings, or specimens: yes    Risk of Complications, Morbidity, and/or Mortality  Presenting problems: high  Diagnostic procedures: moderate  Management options: moderate  General comments: Patient has remained hemodynamically stable. No vomiting in the department. Blood work, vital signs, x-ray are unremarkable and patient has a benign abdomen.   He tells me that Zofran has worked for him in the past.  Discharging with Zofran, Phenergan, Bentyl, and famotidine      I personally reviewed the patient's vital signs, laboratory tests, and/or radiological findings. I discussed these findings with the patient and their significance. I answered all questions and gave the patient clear return precautions. The patient was discharged from the emergency department in stable condition        Patient Progress  Patient progress: improved    ED Course as of Aug 22 1337   Sat Aug 22, 2020   1327 Patient requesting something to drink.     [JS]      ED Course User Index  [JS] Fabrice Pham MD       Procedures

## 2020-08-22 NOTE — ED TRIAGE NOTES
Patient ambulated into triage without difficulty. Wearing mask. Patient reports poor appetite x3 days with vomiting. Reports general abdominal pain. Had shoulder surgery on right arm 2 months ago. A+O x4. Last BM x4 days ago. Denies fever or diarrhea.

## 2020-08-23 ENCOUNTER — APPOINTMENT (OUTPATIENT)
Dept: ULTRASOUND IMAGING | Age: 55
End: 2020-08-23
Attending: EMERGENCY MEDICINE

## 2020-08-23 ENCOUNTER — HOSPITAL ENCOUNTER (EMERGENCY)
Age: 55
Discharge: HOME | End: 2020-08-23
Attending: EMERGENCY MEDICINE

## 2020-08-23 VITALS
SYSTOLIC BLOOD PRESSURE: 131 MMHG | HEART RATE: 57 BPM | TEMPERATURE: 99 F | BODY MASS INDEX: 29.52 KG/M2 | WEIGHT: 230 LBS | HEIGHT: 74 IN | DIASTOLIC BLOOD PRESSURE: 81 MMHG | RESPIRATION RATE: 16 BRPM | OXYGEN SATURATION: 95 %

## 2020-08-23 DIAGNOSIS — R10.9 ACUTE ABDOMINAL PAIN: Primary | ICD-10-CM

## 2020-08-23 DIAGNOSIS — R11.2 NON-INTRACTABLE VOMITING WITH NAUSEA, UNSPECIFIED VOMITING TYPE: ICD-10-CM

## 2020-08-23 DIAGNOSIS — N39.0 URINARY TRACT INFECTION WITHOUT HEMATURIA, SITE UNSPECIFIED: ICD-10-CM

## 2020-08-23 LAB
ALBUMIN SERPL-MCNC: 4 G/DL (ref 3.5–5)
ALBUMIN/GLOB SERPL: 0.9 {RATIO} (ref 1.2–3.5)
ALP SERPL-CCNC: 89 U/L (ref 50–136)
ALT SERPL-CCNC: 30 U/L (ref 12–65)
ANION GAP SERPL CALC-SCNC: 9 MMOL/L (ref 7–16)
AST SERPL-CCNC: 15 U/L (ref 15–37)
BACTERIA URNS QL MICRO: ABNORMAL /HPF
BASOPHILS # BLD: 0.1 K/UL (ref 0–0.2)
BASOPHILS NFR BLD: 1 % (ref 0–2)
BILIRUB SERPL-MCNC: 0.6 MG/DL (ref 0.2–1.1)
BUN SERPL-MCNC: 14 MG/DL (ref 6–23)
CALCIUM SERPL-MCNC: 9.8 MG/DL (ref 8.3–10.4)
CASTS URNS QL MICRO: ABNORMAL /LPF
CHLORIDE SERPL-SCNC: 105 MMOL/L (ref 98–107)
CO2 SERPL-SCNC: 26 MMOL/L (ref 21–32)
CREAT SERPL-MCNC: 1.08 MG/DL (ref 0.8–1.5)
CRYSTALS URNS QL MICRO: 0 /LPF
DIFFERENTIAL METHOD BLD: NORMAL
EOSINOPHIL # BLD: 0 K/UL (ref 0–0.8)
EOSINOPHIL NFR BLD: 1 % (ref 0.5–7.8)
EPI CELLS #/AREA URNS HPF: ABNORMAL /HPF
ERYTHROCYTE [DISTWIDTH] IN BLOOD BY AUTOMATED COUNT: 13.3 % (ref 11.9–14.6)
GLOBULIN SER CALC-MCNC: 4.4 G/DL (ref 2.3–3.5)
GLUCOSE SERPL-MCNC: 104 MG/DL (ref 65–100)
HCT VFR BLD AUTO: 47.3 % (ref 41.1–50.3)
HGB BLD-MCNC: 15.7 G/DL (ref 13.6–17.2)
IMM GRANULOCYTES # BLD AUTO: 0 K/UL (ref 0–0.5)
IMM GRANULOCYTES NFR BLD AUTO: 0 % (ref 0–5)
LIPASE SERPL-CCNC: 169 U/L (ref 73–393)
LYMPHOCYTES # BLD: 1.6 K/UL (ref 0.5–4.6)
LYMPHOCYTES NFR BLD: 22 % (ref 13–44)
MAGNESIUM SERPL-MCNC: 2.3 MG/DL (ref 1.8–2.4)
MCH RBC QN AUTO: 29.9 PG (ref 26.1–32.9)
MCHC RBC AUTO-ENTMCNC: 33.2 G/DL (ref 31.4–35)
MCV RBC AUTO: 90.1 FL (ref 79.6–97.8)
MONOCYTES # BLD: 0.6 K/UL (ref 0.1–1.3)
MONOCYTES NFR BLD: 8 % (ref 4–12)
MUCOUS THREADS URNS QL MICRO: 0 /LPF
NEUTS SEG # BLD: 4.9 K/UL (ref 1.7–8.2)
NEUTS SEG NFR BLD: 68 % (ref 43–78)
NRBC # BLD: 0 K/UL (ref 0–0.2)
OTHER OBSERVATIONS,UCOM: ABNORMAL
PLATELET # BLD AUTO: 325 K/UL (ref 150–450)
PMV BLD AUTO: 9.6 FL (ref 9.4–12.3)
POTASSIUM SERPL-SCNC: 3.7 MMOL/L (ref 3.5–5.1)
PROT SERPL-MCNC: 8.4 G/DL (ref 6.3–8.2)
RBC # BLD AUTO: 5.25 M/UL (ref 4.23–5.6)
RBC #/AREA URNS HPF: ABNORMAL /HPF
SODIUM SERPL-SCNC: 140 MMOL/L (ref 136–145)
TROPONIN-HIGH SENSITIVITY: 5.7 PG/ML (ref 0–14)
WBC # BLD AUTO: 7.2 K/UL (ref 4.3–11.1)
WBC URNS QL MICRO: ABNORMAL /HPF

## 2020-08-23 PROCEDURE — 85025 COMPLETE CBC W/AUTO DIFF WBC: CPT

## 2020-08-23 PROCEDURE — 84484 ASSAY OF TROPONIN QUANT: CPT

## 2020-08-23 PROCEDURE — 74011000258 HC RX REV CODE- 258: Performed by: EMERGENCY MEDICINE

## 2020-08-23 PROCEDURE — 80053 COMPREHEN METABOLIC PANEL: CPT

## 2020-08-23 PROCEDURE — 74011250636 HC RX REV CODE- 250/636: Performed by: EMERGENCY MEDICINE

## 2020-08-23 PROCEDURE — 81015 MICROSCOPIC EXAM OF URINE: CPT

## 2020-08-23 PROCEDURE — 96376 TX/PRO/DX INJ SAME DRUG ADON: CPT

## 2020-08-23 PROCEDURE — 76705 ECHO EXAM OF ABDOMEN: CPT

## 2020-08-23 PROCEDURE — 87086 URINE CULTURE/COLONY COUNT: CPT

## 2020-08-23 PROCEDURE — 99284 EMERGENCY DEPT VISIT MOD MDM: CPT

## 2020-08-23 PROCEDURE — 93005 ELECTROCARDIOGRAM TRACING: CPT | Performed by: EMERGENCY MEDICINE

## 2020-08-23 PROCEDURE — 87088 URINE BACTERIA CULTURE: CPT

## 2020-08-23 PROCEDURE — 81003 URINALYSIS AUTO W/O SCOPE: CPT

## 2020-08-23 PROCEDURE — 74011000250 HC RX REV CODE- 250: Performed by: EMERGENCY MEDICINE

## 2020-08-23 PROCEDURE — 96365 THER/PROPH/DIAG IV INF INIT: CPT

## 2020-08-23 PROCEDURE — 87186 SC STD MICRODIL/AGAR DIL: CPT

## 2020-08-23 PROCEDURE — 83690 ASSAY OF LIPASE: CPT

## 2020-08-23 PROCEDURE — 83735 ASSAY OF MAGNESIUM: CPT

## 2020-08-23 RX ORDER — HYDROMORPHONE HYDROCHLORIDE 1 MG/ML
0.5 INJECTION, SOLUTION INTRAMUSCULAR; INTRAVENOUS; SUBCUTANEOUS
Status: COMPLETED | OUTPATIENT
Start: 2020-08-23 | End: 2020-08-23

## 2020-08-23 RX ORDER — PROMETHAZINE HYDROCHLORIDE 25 MG/1
25 SUPPOSITORY RECTAL
Qty: 12 SUPPOSITORY | Refills: 0 | Status: SHIPPED | OUTPATIENT
Start: 2020-08-23 | End: 2020-08-30

## 2020-08-23 RX ORDER — CIPROFLOXACIN 500 MG/1
500 TABLET ORAL 2 TIMES DAILY
Qty: 20 TAB | Refills: 0 | Status: SHIPPED | OUTPATIENT
Start: 2020-08-23 | End: 2020-09-02

## 2020-08-23 RX ORDER — ONDANSETRON 2 MG/ML
4 INJECTION INTRAMUSCULAR; INTRAVENOUS
Status: COMPLETED | OUTPATIENT
Start: 2020-08-23 | End: 2020-08-23

## 2020-08-23 RX ADMIN — HYDROMORPHONE HYDROCHLORIDE 0.5 MG: 1 INJECTION, SOLUTION INTRAMUSCULAR; INTRAVENOUS; SUBCUTANEOUS at 14:53

## 2020-08-23 RX ADMIN — FAMOTIDINE 20 MG: 10 INJECTION INTRAVENOUS at 14:54

## 2020-08-23 RX ADMIN — ONDANSETRON 4 MG: 2 INJECTION INTRAMUSCULAR; INTRAVENOUS at 14:24

## 2020-08-23 RX ADMIN — SODIUM CHLORIDE 1000 ML: 900 INJECTION, SOLUTION INTRAVENOUS at 14:24

## 2020-08-23 RX ADMIN — CEFTRIAXONE SODIUM 1 G: 1 INJECTION, POWDER, FOR SOLUTION INTRAMUSCULAR; INTRAVENOUS at 15:36

## 2020-08-23 NOTE — ED TRIAGE NOTES
Pt ambulatory to triage without complications. Pt states for 5 days he vomits when he eats. Pt was seen yesterday. Pt states he has some cp and dizziness. Pt states he has HA as well. Abd pain is mid. Dr. Gia Jorgensen to triage. Pt denies diarrhea. Pt denies hx of cardiac issues. Pt denies radiation of pain.

## 2020-08-23 NOTE — ED NOTES
I have reviewed discharge instructions with the patient. The patient verbalized understanding. Patient left ED via Discharge Method: ambulatory to Home with spouse    Opportunity for questions and clarification provided. Patient given 2 scripts. No Esign        To continue your aftercare when you leave the hospital, you may receive an automated call from our care team to check in on how you are doing. This is a free service and part of our promise to provide the best care and service to meet your aftercare needs.  If you have questions, or wish to unsubscribe from this service please call [phone]. Thank you for Choosing our [facility] Emergency Department.

## 2020-08-23 NOTE — DISCHARGE INSTRUCTIONS
Urinary Tract Infections in Men: Care Instructions  Your Care Instructions     A urinary tract infection, or UTI, is a general term for an infection anywhere between the kidneys and the tip of the penis. UTIs can also be a result of a prostate problem. Most cause pain or burning when you urinate. Most UTIs are caused by bacteria and can be cured with antibiotics. It is important to complete your treatment so that the infection does not get worse. Follow-up care is a key part of your treatment and safety. Be sure to make and go to all appointments, and call your doctor if you are having problems. It's also a good idea to know your test results and keep a list of the medicines you take. How can you care for yourself at home? · Take your antibiotics as prescribed. Do not stop taking them just because you feel better. You need to take the full course of antibiotics. · Take your medicines exactly as prescribed. Your doctor may have prescribed a medicine, such as phenazopyridine (Pyridium), to help relieve pain when you urinate. This turns your urine orange. You may stop taking it when your symptoms get better. But be sure to take all of your antibiotics, which treat the infection. · Drink extra water for the next day or two. This will help make the urine less concentrated and help wash out the bacteria causing the infection. (If you have kidney, heart, or liver disease and have to limit your fluids, talk with your doctor before you increase your fluid intake.)  · Avoid drinks that are carbonated or have caffeine. They can irritate the bladder. · Urinate often. Try to empty your bladder each time. · To relieve pain, take a hot bath or lay a heating pad (set on low) over your lower belly or genital area. Never go to sleep with a heating pad in place. To help prevent UTIs  · Drink plenty of fluids, enough so that your urine is light yellow or clear like water.  If you have kidney, heart, or liver disease and have to limit fluids, talk with your doctor before you increase the amount of fluids you drink. · Urinate when you have the urge. Do not hold your urine for a long time. Urinate before you go to sleep. · Keep your penis clean. Catheter care  If you have a drainage tube (catheter) in place, the following steps will help you care for it. · Always wash your hands before and after touching your catheter. · Check the area around the urethra for inflammation or signs of infection. Signs of infection include irritated, swollen, red, or tender skin, or pus around the catheter. · Clean the area around the catheter with soap and water two times a day. Dry with a clean towel afterward. · Do not apply powder or lotion to the skin around the catheter. To empty the urine collection bag   · Wash your hands with soap and water. · Without touching the drain spout, remove the spout from its sleeve at the bottom of the collection bag. Open the valve on the spout. · Let the urine flow out of the bag and into the toilet or a container. Do not let the tubing or drain spout touch anything. · After you empty the bag, clean the end of the drain spout with tissue and water. Close the valve and put the drain spout back into its sleeve at the bottom of the collection bag. · Wash your hands with soap and water. When should you call for help? Call your doctor now or seek immediate medical care if:  · Symptoms such as a fever, chills, nausea, or vomiting get worse or happen for the first time. · You have new pain in your back just below your rib cage. This is called flank pain. · There is new blood or pus in your urine. · You are not able to take or keep down your antibiotics. Watch closely for changes in your health, and be sure to contact your doctor if:  · You are not getting better after taking an antibiotic for 2 days. · Your symptoms go away but then come back. Where can you learn more?   Go to http://bob-dom.info/  Enter L729 in the search box to learn more about \"Urinary Tract Infections in Men: Care Instructions. \"  Current as of: August 22, 2019               Content Version: 12.5  © 0552-6306 [employer]. Care instructions adapted under license by [employer] (which disclaims liability or warranty for this information). If you have questions about a medical condition or this instruction, always ask your healthcare professional. [address] any warranty or liability for your use of this information. Patient Education        Abdominal Pain: Care Instructions  Your Care Instructions     Abdominal pain has many possible causes. Some aren't serious and get better on their own in a few days. Others need more testing and treatment. If your pain continues or gets worse, you need to be rechecked and may need more tests to find out what is wrong. You may need surgery to correct the problem. Don't ignore new symptoms, such as fever, nausea and vomiting, urination problems, pain that gets worse, and dizziness. These may be signs of a more serious problem. Your doctor may have recommended a follow-up visit in the next 8 to 12 hours. If you are not getting better, you may need more tests or treatment. The doctor has checked you carefully, but problems can develop later. If you notice any problems or new symptoms, get medical treatment right away. Follow-up care is a key part of your treatment and safety. Be sure to make and go to all appointments, and call your doctor if you are having problems. It's also a good idea to know your test results and keep a list of the medicines you take. How can you care for yourself at home? · Rest until you feel better. · To prevent dehydration, drink plenty of fluids, enough so that your urine is light yellow or clear like water.  Choose water and other caffeine-free clear liquids until you feel better. If you have kidney, heart, or liver disease and have to limit fluids, talk with your doctor before you increase the amount of fluids you drink. · If your stomach is upset, eat mild foods, such as rice, dry toast or crackers, bananas, and applesauce. Try eating several small meals instead of two or three large ones. · Wait until 48 hours after all symptoms have gone away before you have spicy foods, alcohol, and drinks that contain caffeine. · Do not eat foods that are high in fat. · Avoid anti-inflammatory medicines such as aspirin, ibuprofen (Advil, Motrin), and naproxen (Aleve). These can cause stomach upset. Talk to your doctor if you take daily aspirin for another health problem. When should you call for help? [id number] anytime you think you may need emergency care. For example, call if:  · You passed out (lost consciousness). · You pass maroon or very bloody stools. · You vomit blood or what looks like coffee grounds. · You have new, severe belly pain. Call your doctor now or seek immediate medical care if:  · Your pain gets worse, especially if it becomes focused in one area of your belly. · You have a new or higher fever. · Your stools are black and look like tar, or they have streaks of blood. · You have unexpected vaginal bleeding. · You have symptoms of a urinary tract infection. These may include:  ? Pain when you urinate. ? Urinating more often than usual.  ? Blood in your urine. · You are dizzy or lightheaded, or you feel like you may faint. Watch closely for changes in your health, and be sure to contact your doctor if:  · You are not getting better after 1 day (24 hours). Where can you learn more? Go to http://bob-dom.info/  Enter A142 in the search box to learn more about \"Abdominal Pain: Care Instructions. \"  Current as of: June 26, 2019               Content Version: 12.5  © 5221-6499 [employer].    Care instructions adapted under license by [address] (which disclaims liability or warranty for this information). If you have questions about a medical condition or this instruction, always ask your healthcare professional. [address] any warranty or liability for your use of this information. Patient Education        Nausea and Vomiting: Care Instructions  Your Care Instructions     When you are nauseated, you may feel weak and sweaty and notice a lot of saliva in your mouth. Nausea often leads to vomiting. Most of the time you do not need to worry about nausea and vomiting, but they can be signs of other illnesses. Two common causes of nausea and vomiting are stomach flu and food poisoning. Nausea and vomiting from viral stomach flu will usually start to improve within 24 hours. Nausea and vomiting from food poisoning may last from 12 to 48 hours. The doctor has checked you carefully, but problems can develop later. If you notice any problems or new symptoms, get medical treatment right away. Follow-up care is a key part of your treatment and safety. Be sure to make and go to all appointments, and call your doctor if you are having problems. It's also a good idea to know your test results and keep a list of the medicines you take. How can you care for yourself at home? · To prevent dehydration, drink plenty of fluids, enough so that your urine is light yellow or clear like water. Choose water and other caffeine-free clear liquids until you feel better. If you have kidney, heart, or liver disease and have to limit fluids, talk with your doctor before you increase the amount of fluids you drink. · Rest in bed until you feel better. · When you are able to eat, try clear soups, mild foods, and liquids until all symptoms are gone for 12 to 48 hours. Other good choices include dry toast, crackers, cooked cereal, and gelatin dessert, such as Jell-O. When should you call for help?    [id number] anytime you think you may need emergency care. For example, call if:  · You passed out (lost consciousness). Call your doctor now or seek immediate medical care if:  · You have symptoms of dehydration, such as:  ? Dry eyes and a dry mouth. ? Passing only a little dark urine. ? Feeling thirstier than usual.  · You have new or worsening belly pain. · You have a new or higher fever. · You vomit blood or what looks like coffee grounds. Watch closely for changes in your health, and be sure to contact your doctor if:  · You have ongoing nausea and vomiting. · Your vomiting is getting worse. · Your vomiting lasts longer than 2 days. · You are not getting better as expected. Where can you learn more? Go to http://bob-dom.info/  Enter H591 in the search box to learn more about \"Nausea and Vomiting: Care Instructions. \"  Current as of: June 26, 2019               Content Version: 12.5  © 5875-0067 [employer]. Care instructions adapted under license by [employer] (which disclaims liability or warranty for this information). If you have questions about a medical condition or this instruction, always ask your healthcare professional. [place] [address] any warranty or liability for your use of this information.

## 2020-08-24 LAB
ATRIAL RATE: 52 BPM
CALCULATED P AXIS, ECG09: 71 DEGREES
CALCULATED R AXIS, ECG10: 34 DEGREES
CALCULATED T AXIS, ECG11: 41 DEGREES
DIAGNOSIS, 93000: NORMAL
P-R INTERVAL, ECG05: 160 MS
Q-T INTERVAL, ECG07: 446 MS
QRS DURATION, ECG06: 96 MS
QTC CALCULATION (BEZET), ECG08: 414 MS
VENTRICULAR RATE, ECG03: 52 BPM

## 2020-08-26 ENCOUNTER — APPOINTMENT (OUTPATIENT)
Dept: CT IMAGING | Age: 55
End: 2020-08-26
Attending: EMERGENCY MEDICINE
Payer: COMMERCIAL

## 2020-08-26 ENCOUNTER — HOSPITAL ENCOUNTER (EMERGENCY)
Age: 55
Discharge: HOME | End: 2020-08-26
Attending: EMERGENCY MEDICINE
Payer: COMMERCIAL

## 2020-08-26 VITALS
OXYGEN SATURATION: 98 % | SYSTOLIC BLOOD PRESSURE: 132 MMHG | TEMPERATURE: 98.8 F | WEIGHT: 230 LBS | BODY MASS INDEX: 29.52 KG/M2 | RESPIRATION RATE: 16 BRPM | DIASTOLIC BLOOD PRESSURE: 64 MMHG | HEIGHT: 74 IN | HEART RATE: 72 BPM

## 2020-08-26 DIAGNOSIS — R10.84 ABDOMINAL PAIN, GENERALIZED: Primary | ICD-10-CM

## 2020-08-26 DIAGNOSIS — E87.6 HYPOKALEMIA: ICD-10-CM

## 2020-08-26 DIAGNOSIS — R11.2 NAUSEA AND VOMITING, INTRACTABILITY OF VOMITING NOT SPECIFIED, UNSPECIFIED VOMITING TYPE: ICD-10-CM

## 2020-08-26 LAB
ALBUMIN SERPL-MCNC: 2.8 G/DL (ref 3.5–5)
ALBUMIN/GLOB SERPL: 0.8 {RATIO} (ref 1.2–3.5)
ALP SERPL-CCNC: 63 U/L (ref 50–136)
ALT SERPL-CCNC: 19 U/L (ref 12–65)
ANION GAP SERPL CALC-SCNC: 8 MMOL/L (ref 7–16)
AST SERPL-CCNC: 13 U/L (ref 15–37)
BACTERIA SPEC CULT: ABNORMAL
BACTERIA URNS QL MICRO: ABNORMAL /HPF
BASOPHILS # BLD: 0.1 K/UL (ref 0–0.2)
BASOPHILS NFR BLD: 1 % (ref 0–2)
BILIRUB SERPL-MCNC: 0.4 MG/DL (ref 0.2–1.1)
BUN SERPL-MCNC: 10 MG/DL (ref 6–23)
CALCIUM SERPL-MCNC: 7.4 MG/DL (ref 8.3–10.4)
CASTS URNS QL MICRO: ABNORMAL /LPF
CHLORIDE SERPL-SCNC: 113 MMOL/L (ref 98–107)
CO2 SERPL-SCNC: 24 MMOL/L (ref 21–32)
CREAT SERPL-MCNC: 0.79 MG/DL (ref 0.8–1.5)
CRYSTALS URNS QL MICRO: 0 /LPF
DIFFERENTIAL METHOD BLD: NORMAL
EOSINOPHIL # BLD: 0.1 K/UL (ref 0–0.8)
EOSINOPHIL NFR BLD: 1 % (ref 0.5–7.8)
EPI CELLS #/AREA URNS HPF: ABNORMAL /HPF
ERYTHROCYTE [DISTWIDTH] IN BLOOD BY AUTOMATED COUNT: 13.1 % (ref 11.9–14.6)
GLOBULIN SER CALC-MCNC: 3.5 G/DL (ref 2.3–3.5)
GLUCOSE SERPL-MCNC: 82 MG/DL (ref 65–100)
HCT VFR BLD AUTO: 49 % (ref 41.1–50.3)
HGB BLD-MCNC: 17.1 G/DL (ref 13.6–17.2)
IMM GRANULOCYTES # BLD AUTO: 0 K/UL (ref 0–0.5)
IMM GRANULOCYTES NFR BLD AUTO: 0 % (ref 0–5)
LIPASE SERPL-CCNC: 130 U/L (ref 73–393)
LYMPHOCYTES # BLD: 1.8 K/UL (ref 0.5–4.6)
LYMPHOCYTES NFR BLD: 24 % (ref 13–44)
MCH RBC QN AUTO: 30.7 PG (ref 26.1–32.9)
MCHC RBC AUTO-ENTMCNC: 34.9 G/DL (ref 31.4–35)
MCV RBC AUTO: 88 FL (ref 79.6–97.8)
MONOCYTES # BLD: 0.7 K/UL (ref 0.1–1.3)
MONOCYTES NFR BLD: 9 % (ref 4–12)
MUCOUS THREADS URNS QL MICRO: ABNORMAL /LPF
NEUTS SEG # BLD: 4.6 K/UL (ref 1.7–8.2)
NEUTS SEG NFR BLD: 65 % (ref 43–78)
NRBC # BLD: 0 K/UL (ref 0–0.2)
OTHER OBSERVATIONS,UCOM: ABNORMAL
PLATELET # BLD AUTO: 305 K/UL (ref 150–450)
PLATELET COMMENTS,PCOM: ADEQUATE
PMV BLD AUTO: 10.6 FL (ref 9.4–12.3)
POTASSIUM SERPL-SCNC: 2.9 MMOL/L (ref 3.5–5.1)
PROT SERPL-MCNC: 6.3 G/DL (ref 6.3–8.2)
RBC # BLD AUTO: 5.57 M/UL (ref 4.23–5.6)
RBC #/AREA URNS HPF: ABNORMAL /HPF
RBC MORPH BLD: NORMAL
SERVICE CMNT-IMP: ABNORMAL
SODIUM SERPL-SCNC: 145 MMOL/L (ref 136–145)
WBC # BLD AUTO: 7.3 K/UL (ref 4.3–11.1)
WBC MORPH BLD: NORMAL
WBC URNS QL MICRO: ABNORMAL /HPF

## 2020-08-26 PROCEDURE — 74177 CT ABD & PELVIS W/CONTRAST: CPT

## 2020-08-26 PROCEDURE — 96375 TX/PRO/DX INJ NEW DRUG ADDON: CPT

## 2020-08-26 PROCEDURE — 85025 COMPLETE CBC W/AUTO DIFF WBC: CPT

## 2020-08-26 PROCEDURE — 80053 COMPREHEN METABOLIC PANEL: CPT

## 2020-08-26 PROCEDURE — 74011000258 HC RX REV CODE- 258: Performed by: EMERGENCY MEDICINE

## 2020-08-26 PROCEDURE — 74011250636 HC RX REV CODE- 250/636: Performed by: EMERGENCY MEDICINE

## 2020-08-26 PROCEDURE — 96365 THER/PROPH/DIAG IV INF INIT: CPT

## 2020-08-26 PROCEDURE — 74011000636 HC RX REV CODE- 636: Performed by: EMERGENCY MEDICINE

## 2020-08-26 PROCEDURE — 96366 THER/PROPH/DIAG IV INF ADDON: CPT

## 2020-08-26 PROCEDURE — 83690 ASSAY OF LIPASE: CPT

## 2020-08-26 PROCEDURE — 81015 MICROSCOPIC EXAM OF URINE: CPT

## 2020-08-26 PROCEDURE — 99284 EMERGENCY DEPT VISIT MOD MDM: CPT

## 2020-08-26 PROCEDURE — 81003 URINALYSIS AUTO W/O SCOPE: CPT

## 2020-08-26 RX ORDER — SODIUM CHLORIDE 0.9 % (FLUSH) 0.9 %
10 SYRINGE (ML) INJECTION
Status: COMPLETED | OUTPATIENT
Start: 2020-08-26 | End: 2020-08-26

## 2020-08-26 RX ORDER — POTASSIUM CHLORIDE 14.9 MG/ML
20 INJECTION INTRAVENOUS ONCE
Status: COMPLETED | OUTPATIENT
Start: 2020-08-26 | End: 2020-08-26

## 2020-08-26 RX ORDER — POTASSIUM CHLORIDE 29.8 MG/ML
20 INJECTION INTRAVENOUS
Status: DISCONTINUED | OUTPATIENT
Start: 2020-08-26 | End: 2020-08-26 | Stop reason: DRUGHIGH

## 2020-08-26 RX ORDER — ONDANSETRON 2 MG/ML
4 INJECTION INTRAMUSCULAR; INTRAVENOUS
Status: COMPLETED | OUTPATIENT
Start: 2020-08-26 | End: 2020-08-26

## 2020-08-26 RX ORDER — HYDROMORPHONE HYDROCHLORIDE 1 MG/ML
0.5 INJECTION, SOLUTION INTRAMUSCULAR; INTRAVENOUS; SUBCUTANEOUS ONCE
Status: COMPLETED | OUTPATIENT
Start: 2020-08-26 | End: 2020-08-26

## 2020-08-26 RX ADMIN — HYDROMORPHONE HYDROCHLORIDE 0.5 MG: 1 INJECTION, SOLUTION INTRAMUSCULAR; INTRAVENOUS; SUBCUTANEOUS at 17:18

## 2020-08-26 RX ADMIN — SODIUM CHLORIDE 100 ML: 900 INJECTION, SOLUTION INTRAVENOUS at 18:15

## 2020-08-26 RX ADMIN — SODIUM CHLORIDE 1000 ML: 900 INJECTION, SOLUTION INTRAVENOUS at 17:18

## 2020-08-26 RX ADMIN — ONDANSETRON 4 MG: 2 INJECTION INTRAMUSCULAR; INTRAVENOUS at 17:18

## 2020-08-26 RX ADMIN — Medication 10 ML: at 18:15

## 2020-08-26 RX ADMIN — POTASSIUM CHLORIDE 20 MEQ: 200 INJECTION, SOLUTION INTRAVENOUS at 17:19

## 2020-08-26 RX ADMIN — IOPAMIDOL 100 ML: 755 INJECTION, SOLUTION INTRAVENOUS at 18:15

## 2020-08-26 NOTE — ED PROVIDER NOTES
60-year-old male with history of arthritis presents with persistent nausea, vomiting, diffuse generalized abdominal discomfort over the past several days. Patient has been seen twice in the ER recently for this. Patient was initially seen on 8/22 and subsequently seen on 8/23. On most recent hospitalization patient was diagnosed with urinary tract infection and discharged home with Cipro and Phenergan. Patient states that he has had persistent nausea, vomiting, generalized abdominal discomfort. States that because of his nausea and vomiting he has had difficulty keeping down his medications. Denies chest pain, shortness of breath, fever, chills, flank pain, hematuria, dizziness, weakness. Denies diarrhea or constipation. The history is provided by the patient. No  was used. Past Medical History:   Diagnosis Date    Arthritis        Past Surgical History:   Procedure Laterality Date    HX ORTHOPAEDIC  late 1990's    \"torn ligament\" \"reconstruction\" both knees         Family History:   Problem Relation Age of Onset    Heart Disease Mother        Social History     Socioeconomic History    Marital status:      Spouse name: Not on file    Number of children: Not on file    Years of education: Not on file    Highest education level: Not on file   Occupational History    Not on file   Social Needs    Financial resource strain: Not on file    Food insecurity     Worry: Not on file     Inability: Not on file    Transportation needs     Medical: Not on file     Non-medical: Not on file   Tobacco Use    Smoking status: Never Smoker    Smokeless tobacco: Never Used   Substance and Sexual Activity    Alcohol use:  Yes     Alcohol/week: 16.0 standard drinks     Types: 14 Cans of beer, 2 Shots of liquor per week    Drug use: Not Currently     Types: Marijuana     Comment: last use approximately one month ago    Sexual activity: Not on file   Lifestyle    Physical ALLERGIES:  No Known Allergies    CC:  Skin lesion    HISTORY OF PRESENT ILLNESS:  The patient is a 31 month old male here for skin lesion.  Lesion of concern located on the left leg   Duration: since birth   Changes: getting larger   Bleeding: no   Painful: yes   Itching: yes    Irregularly pigmented: no         No Yes  [x]  []  The patient requests a full skin exam.    SOCIAL HISTORY:  Occupation: none   Marital status: Lives with parents     No Yes  [x]  []  History of skin cancer  [x]  []  History of dysplastic nevi    [x]  []  Family History of melanoma  [x]  []  Family History of non-melanoma skin cancer       REVIEW OF SYSTEMS:  No Yes  [x]  []  History of excessive sun exposure  [x]  []  Previous or current tanning bed use  [x]  []  History of 3 or more blistering sunburns  [x]  []  History of keloid scar or thick scars  [x]  []  Allergy to topical antibiotics or band aids  [x]  []  History of therapeutic radiation treatment  [x]  []  History of prosthetic joint and date of surgery  [x]  []  History of pacemaker or defibrillator  [x]  []  Current use of blood thinner    No past medical history on file.    Current Outpatient Medications   Medication   • hydroCORTisone (CORTIZONE) 2.5 % ointment   • acetaminophen (TYLENOL) 160 MG/5ML elixir     No current facility-administered medications for this visit.        EXAM:  Pleasant, well appearing, no distress. Mood and affect are normal. The patient is oriented to person, place and time.  Left lateral thigh with 1.0x0.7cm thin evenly pigmented plaque with excoriation and hemorrhagic crust at lateral edge.  There are benign features clinically and dermatoscopically.     IMPRESSION AND PLAN:  Congential melanocytic nevus, irritated, benign appearing.  No treatment is required.  If the lesion is persistently symptomatic excision can be considered when the patient is older and capable of tolerating the procedure.         FOLLOW UP:  PRN      CC:  Elen Miller MD      On 4/7/2020, I, HOME Tejada scribed the services personally performed by Magy Carver MD  The documentation recorded by the scribe accurately and completely reflects the service(s) I personally performed and the decisions made by me.         activity     Days per week: Not on file     Minutes per session: Not on file    Stress: Not on file   Relationships    Social connections     Talks on phone: Not on file     Gets together: Not on file     Attends Jehovah's witness service: Not on file     Active member of club or organization: Not on file     Attends meetings of clubs or organizations: Not on file     Relationship status: Not on file    Intimate partner violence     Fear of current or ex partner: Not on file     Emotionally abused: Not on file     Physically abused: Not on file     Forced sexual activity: Not on file   Other Topics Concern    Not on file   Social History Narrative    Not on file         ALLERGIES: Patient has no known allergies. Review of Systems   Constitutional: Negative for chills, diaphoresis, fatigue and fever. HENT: Negative for congestion, rhinorrhea and sore throat. Respiratory: Negative for cough and shortness of breath. Cardiovascular: Negative for chest pain. Gastrointestinal: Positive for abdominal pain, nausea and vomiting. Negative for blood in stool, constipation and diarrhea. Genitourinary: Negative for dysuria, flank pain and hematuria. Musculoskeletal: Negative for arthralgias and myalgias. Skin: Negative for color change and rash. Neurological: Negative for dizziness, weakness, light-headedness and headaches. Vitals:    08/26/20 1630 08/26/20 1700 08/26/20 1717 08/26/20 1730   BP: 112/71 114/71 132/87 130/87   Pulse: 71 69 63 69   Resp:       Temp:       SpO2: 97% 97% 94% 97%   Weight:       Height:                Physical Exam  Vitals signs and nursing note reviewed. Constitutional:       Appearance: He is well-developed. Comments: Nontoxic in appearance. HENT:      Head: Normocephalic. Mouth/Throat:      Mouth: Mucous membranes are moist.      Comments: MMM. Eyes:      Extraocular Movements: Extraocular movements intact.       Pupils: Pupils are equal, round, and reactive to light. Cardiovascular:      Rate and Rhythm: Normal rate and regular rhythm. Heart sounds: Normal heart sounds. Comments: Pulses 2+ and equal throughout. Pulmonary:      Effort: Pulmonary effort is normal.      Breath sounds: Normal breath sounds. Comments: CTAB. Abdominal:      General: Abdomen is flat. Bowel sounds are normal.      Palpations: Abdomen is soft. Comments: Soft. Mild diffuse generalized TTP. No rebound or guarding. No peritoneal signs. No CVA tenderness. Skin:     General: Skin is warm. Findings: No rash. Neurological:      General: No focal deficit present. Mental Status: He is alert and oriented to person, place, and time. Comments: No focal deficits. Strength 5 out of 5 throughout. MDM  Number of Diagnoses or Management Options  Abdominal pain, generalized: new and requires workup  Hypokalemia: new and requires workup  Nausea and vomiting, intractability of vomiting not specified, unspecified vomiting type: new and requires workup  Diagnosis management comments: Vital signs stable. Orders made for IV fluid hydration, pain control, Zofran. Potassium 2.9 IV potassium replacement ordered. CT abd/pelvis with no acute or concerning findings. Urine micro sent to lab. Pt reports improvement of pain. Instructed to continue to take Zofran, Phenergan, and Cipro as previously prescribed. Instructed on need for close follow-up w/ PCP and GI. Given return precautions.           Amount and/or Complexity of Data Reviewed  Clinical lab tests: ordered and reviewed  Tests in the radiology section of CPT®: ordered and reviewed  Tests in the medicine section of CPT®: ordered and reviewed  Review and summarize past medical records: yes  Independent visualization of images, tracings, or specimens: yes    Risk of Complications, Morbidity, and/or Mortality  Presenting problems: moderate  Diagnostic procedures: moderate  Management options: moderate    Patient Progress  Patient progress: stable    ED Course as of Aug 26 1904   Wed Aug 26, 2020   1847 CT abd/pelvis IMPRESSION: No acute findings in abdomen or pelvis. [DF]      ED Course User Index  [DF] Sultana Gonzalez MD       Procedures      Results Include:    Recent Results (from the past 24 hour(s))   CBC WITH AUTOMATED DIFF    Collection Time: 08/26/20  3:08 PM   Result Value Ref Range    WBC 7.3 4.3 - 11.1 K/uL    RBC 5.57 4.23 - 5.6 M/uL    HGB 17.1 13.6 - 17.2 g/dL    HCT 49.0 41.1 - 50.3 %    MCV 88.0 79.6 - 97.8 FL    MCH 30.7 26.1 - 32.9 PG    MCHC 34.9 31.4 - 35.0 g/dL    RDW 13.1 11.9 - 14.6 %    PLATELET 201 170 - 384 K/uL    MPV 10.6 9.4 - 12.3 FL    ABSOLUTE NRBC 0.00 0.0 - 0.2 K/uL    DF PENDING    LIPASE    Collection Time: 08/26/20  4:03 PM   Result Value Ref Range    Lipase 130 73 - 583 U/L   METABOLIC PANEL, COMPREHENSIVE    Collection Time: 08/26/20  4:03 PM   Result Value Ref Range    Sodium 145 136 - 145 mmol/L    Potassium 2.9 (LL) 3.5 - 5.1 mmol/L    Chloride 113 (H) 98 - 107 mmol/L    CO2 24 21 - 32 mmol/L    Anion gap 8 7 - 16 mmol/L    Glucose 82 65 - 100 mg/dL    BUN 10 6 - 23 MG/DL    Creatinine 0.79 (L) 0.8 - 1.5 MG/DL    GFR est AA >60 >60 ml/min/1.73m2    GFR est non-AA >60 >60 ml/min/1.73m2    Calcium 7.4 (L) 8.3 - 10.4 MG/DL    Bilirubin, total 0.4 0.2 - 1.1 MG/DL    ALT (SGPT) 19 12 - 65 U/L    AST (SGOT) 13 (L) 15 - 37 U/L    Alk. phosphatase 63 50 - 136 U/L    Protein, total 6.3 6.3 - 8.2 g/dL    Albumin 2.8 (L) 3.5 - 5.0 g/dL    Globulin 3.5 2.3 - 3.5 g/dL    A-G Ratio 0.8 (L) 1.2 - 3.5                Franklyn Gutiérrez MD; 8/26/2020 @6:11 PM Voice dictation software was used during the making of this note. This software is not perfect and grammatical and other typographical errors may be present.   This note has not been proofread for errors.  ===================================================================

## 2020-08-26 NOTE — ED TRIAGE NOTES
Pt reports 6 days of abd pain, nv. Denies diarrhea. Seen here twice for this recently. Denies blood in vomit or stool. States he cannot hold any medications down and that he feels dehydrated. NAD. Masked.

## 2020-08-26 NOTE — DISCHARGE INSTRUCTIONS
Continue taking previously prescribed medications including Cipro, Phenergan suppositories, and Zofran. Schedule close follow-up with your primary care physician as well as gastroenterology. Return to emergency department if symptoms worsen or progress in any way. Schedule close follow-up with her primary care physician for repeat potassium check  Abdominal Pain: Care Instructions  Your Care Instructions     Abdominal pain has many possible causes. Some aren't serious and get better on their own in a few days. Others need more testing and treatment. If your pain continues or gets worse, you need to be rechecked and may need more tests to find out what is wrong. You may need surgery to correct the problem. Don't ignore new symptoms, such as fever, nausea and vomiting, urination problems, pain that gets worse, and dizziness. These may be signs of a more serious problem. Your doctor may have recommended a follow-up visit in the next 8 to 12 hours. If you are not getting better, you may need more tests or treatment. The doctor has checked you carefully, but problems can develop later. If you notice any problems or new symptoms, get medical treatment right away. Follow-up care is a key part of your treatment and safety. Be sure to make and go to all appointments, and call your doctor if you are having problems. It's also a good idea to know your test results and keep a list of the medicines you take. How can you care for yourself at home? · Rest until you feel better. · To prevent dehydration, drink plenty of fluids, enough so that your urine is light yellow or clear like water. Choose water and other caffeine-free clear liquids until you feel better. If you have kidney, heart, or liver disease and have to limit fluids, talk with your doctor before you increase the amount of fluids you drink. · If your stomach is upset, eat mild foods, such as rice, dry toast or crackers, bananas, and applesauce.  Try eating several small meals instead of two or three large ones. · Wait until 48 hours after all symptoms have gone away before you have spicy foods, alcohol, and drinks that contain caffeine. · Do not eat foods that are high in fat. · Avoid anti-inflammatory medicines such as aspirin, ibuprofen (Advil, Motrin), and naproxen (Aleve). These can cause stomach upset. Talk to your doctor if you take daily aspirin for another health problem. When should you call for help? [id number] anytime you think you may need emergency care. For example, call if:  · You passed out (lost consciousness). · You pass maroon or very bloody stools. · You vomit blood or what looks like coffee grounds. · You have new, severe belly pain. Call your doctor now or seek immediate medical care if:  · Your pain gets worse, especially if it becomes focused in one area of your belly. · You have a new or higher fever. · Your stools are black and look like tar, or they have streaks of blood. · You have unexpected vaginal bleeding. · You have symptoms of a urinary tract infection. These may include:  ? Pain when you urinate. ? Urinating more often than usual.  ? Blood in your urine. · You are dizzy or lightheaded, or you feel like you may faint. Watch closely for changes in your health, and be sure to contact your doctor if:  · You are not getting better after 1 day (24 hours). Where can you learn more? Go to http://www.gray.com/  Enter M647 in the search box to learn more about \"Abdominal Pain: Care Instructions. \"  Current as of: June 26, 2019               Content Version: 12.5  © 2250-4229 [employer]. Care instructions adapted under license by [employer] (which disclaims liability or warranty for this information).  If you have questions about a medical condition or this instruction, always ask your healthcare professional. [address]  any warranty or liability for your use of this information. Patient Education        Hypokalemia: Care Instructions  Your Care Instructions     Hypokalemia (say \"gl-qp-owf-PAN-raul-uh\") is a low level of potassium. The heart, muscles, kidneys, and nervous system all need potassium to work well. This problem has many different causes. Kidney problems, diet, and medicines like diuretics and laxatives can cause it. So can vomiting or diarrhea. In some cases, cancer is the cause. Your doctor may do tests to find the cause of your low potassium levels. You may need medicines to bring your potassium levels back to normal. You may also need regular blood tests to check your potassium. If you have very low potassium, you may need intravenous (IV) medicines. You also may need tests to check the electrical activity of your heart. Heart problems caused by low potassium levels can be very serious. Follow-up care is a key part of your treatment and safety. Be sure to make and go to all appointments, and call your doctor if you are having problems. It's also a good idea to know your test results and keep a list of the medicines you take. How can you care for yourself at home? · If your doctor recommends it, eat foods that have a lot of potassium. These include fresh fruits, juices, and vegetables. They also include nuts, beans, and milk. · Be safe with medicines. If your doctor prescribes medicines or potassium supplements, take them exactly as directed. Call your doctor if you have any problems with your medicines. · Get your potassium levels tested as often as your doctor tells you. When should you call for help? [id number] anytime you think you may need emergency care. For example, call if:  · You feel like your heart is missing beats. Heart problems caused by low potassium can cause death. · You passed out (lost consciousness). · You have a seizure.   Call your doctor now or seek immediate medical care if:  · You feel weak or unusually tired.  · You have severe arm or leg cramps. · You have tingling or numbness. · You feel sick to your stomach, or you vomit. · You have belly cramps. · You feel bloated or constipated. · You have to urinate a lot. · You feel very thirsty most of the time. · You are dizzy or lightheaded, or you feel like you may faint. · You feel depressed, or you lose touch with reality. Watch closely for changes in your health, and be sure to contact your doctor if:  · You do not get better as expected. Where can you learn more? Go to http://www.gray.com/  Enter G358 in the search box to learn more about \"Hypokalemia: Care Instructions. \"  Current as of: July 29, 2019               Content Version: 12.5  © 0766-1412 [employer]. Care instructions adapted under license by [employer] (which disclaims liability or warranty for this information). If you have questions about a medical condition or this instruction, always ask your healthcare professional. [address] any warranty or liability for your use of this information. Patient Education        Nausea and Vomiting: Care Instructions  Your Care Instructions     When you are nauseated, you may feel weak and sweaty and notice a lot of saliva in your mouth. Nausea often leads to vomiting. Most of the time you do not need to worry about nausea and vomiting, but they can be signs of other illnesses. Two common causes of nausea and vomiting are stomach flu and food poisoning. Nausea and vomiting from viral stomach flu will usually start to improve within 24 hours. Nausea and vomiting from food poisoning may last from 12 to 48 hours. The doctor has checked you carefully, but problems can develop later. If you notice any problems or new symptoms, get medical treatment right away. Follow-up care is a key part of your treatment and safety.  Be sure to make and go to all appointments, and call your doctor if you are having problems. It's also a good idea to know your test results and keep a list of the medicines you take. How can you care for yourself at home? · To prevent dehydration, drink plenty of fluids, enough so that your urine is light yellow or clear like water. Choose water and other caffeine-free clear liquids until you feel better. If you have kidney, heart, or liver disease and have to limit fluids, talk with your doctor before you increase the amount of fluids you drink. · Rest in bed until you feel better. · When you are able to eat, try clear soups, mild foods, and liquids until all symptoms are gone for 12 to 48 hours. Other good choices include dry toast, crackers, cooked cereal, and gelatin dessert, such as Jell-O. When should you call for help? [id number] anytime you think you may need emergency care. For example, call if:  · You passed out (lost consciousness). Call your doctor now or seek immediate medical care if:  · You have symptoms of dehydration, such as:  ? Dry eyes and a dry mouth. ? Passing only a little dark urine. ? Feeling thirstier than usual.  · You have new or worsening belly pain. · You have a new or higher fever. · You vomit blood or what looks like coffee grounds. Watch closely for changes in your health, and be sure to contact your doctor if:  · You have ongoing nausea and vomiting. · Your vomiting is getting worse. · Your vomiting lasts longer than 2 days. · You are not getting better as expected. Where can you learn more? Go to http://bob-dom.info/  Enter H591 in the search box to learn more about \"Nausea and Vomiting: Care Instructions. \"  Current as of: June 26, 2019               Content Version: 12.5  © 5893-9596 [employer]. Care instructions adapted under license by [employer] (which disclaims liability or warranty for this information).  If you have questions about a medical condition or this instruction, always ask your healthcare professional. [place] [address] any warranty or liability for your use of this information.

## 2020-08-27 NOTE — ED NOTES
I have reviewed discharge instructions with the patient. The patient verbalized understanding. Patient left ED via Discharge Method: ambulatory to Home with self. Opportunity for questions and clarification provided. Patient given 0 scripts. To continue your aftercare when you leave the hospital, you may receive an automated call from our care team to check in on how you are doing. This is a free service and part of our promise to provide the best care and service to meet your aftercare needs.  If you have questions, or wish to unsubscribe from this service please call [phone]. Thank you for Choosing our [facility] Emergency Department.

## 2021-06-10 PROBLEM — M19.011 OSTEOARTHRITIS OF RIGHT SHOULDER: Status: ACTIVE | Noted: 2021-06-10

## 2021-06-10 PROBLEM — S46.011S: Status: ACTIVE | Noted: 2021-06-10

## 2021-06-10 PROBLEM — M75.101 RIGHT ROTATOR CUFF TEAR ARTHROPATHY: Status: ACTIVE | Noted: 2021-06-10

## 2021-06-10 PROBLEM — M12.811 RIGHT ROTATOR CUFF TEAR ARTHROPATHY: Status: ACTIVE | Noted: 2021-06-10

## 2021-06-10 PROBLEM — S43.431A SUPERIOR GLENOID LABRUM LESION OF RIGHT SHOULDER: Status: ACTIVE | Noted: 2021-06-10

## 2021-08-20 ENCOUNTER — HOSPITAL ENCOUNTER (OUTPATIENT)
Dept: SURGERY | Age: 56
Discharge: HOME | End: 2021-08-20
Attending: ORTHOPAEDIC SURGERY
Payer: OTHER MISCELLANEOUS

## 2021-08-20 VITALS
HEART RATE: 54 BPM | HEIGHT: 75 IN | BODY MASS INDEX: 27.17 KG/M2 | TEMPERATURE: 98 F | OXYGEN SATURATION: 98 % | RESPIRATION RATE: 16 BRPM | DIASTOLIC BLOOD PRESSURE: 77 MMHG | SYSTOLIC BLOOD PRESSURE: 115 MMHG | WEIGHT: 218.5 LBS

## 2021-08-20 LAB
ANION GAP SERPL CALC-SCNC: 3 MMOL/L (ref 7–16)
BACTERIA SPEC CULT: NORMAL
BUN SERPL-MCNC: 15 MG/DL (ref 6–23)
CALCIUM SERPL-MCNC: 9.9 MG/DL (ref 8.3–10.4)
CHLORIDE SERPL-SCNC: 106 MMOL/L (ref 98–107)
CO2 SERPL-SCNC: 30 MMOL/L (ref 21–32)
CREAT SERPL-MCNC: 0.95 MG/DL (ref 0.8–1.5)
ERYTHROCYTE [DISTWIDTH] IN BLOOD BY AUTOMATED COUNT: 13.7 % (ref 11.9–14.6)
GLUCOSE SERPL-MCNC: 54 MG/DL (ref 65–100)
HCT VFR BLD AUTO: 43.8 % (ref 41.1–50.3)
HGB BLD-MCNC: 14.4 G/DL (ref 13.6–17.2)
MCH RBC QN AUTO: 30.2 PG (ref 26.1–32.9)
MCHC RBC AUTO-ENTMCNC: 32.9 G/DL (ref 31.4–35)
MCV RBC AUTO: 91.8 FL (ref 79.6–97.8)
NRBC # BLD: 0 K/UL (ref 0–0.2)
PLATELET # BLD AUTO: 307 K/UL (ref 150–450)
PMV BLD AUTO: 10 FL (ref 9.4–12.3)
POTASSIUM SERPL-SCNC: 4.1 MMOL/L (ref 3.5–5.1)
RBC # BLD AUTO: 4.77 M/UL (ref 4.23–5.6)
SERVICE CMNT-IMP: NORMAL
SODIUM SERPL-SCNC: 139 MMOL/L (ref 136–145)
WBC # BLD AUTO: 6.2 K/UL (ref 4.3–11.1)

## 2021-08-20 PROCEDURE — 85027 COMPLETE CBC AUTOMATED: CPT

## 2021-08-20 PROCEDURE — 80048 BASIC METABOLIC PNL TOTAL CA: CPT

## 2021-08-20 PROCEDURE — 36415 COLL VENOUS BLD VENIPUNCTURE: CPT

## 2021-08-20 PROCEDURE — 87641 MR-STAPH DNA AMP PROBE: CPT

## 2021-08-20 RX ORDER — SAW/VIT E/SOD SEL/LYC/BETA/PYG 160-100
1 TABLET ORAL DAILY
COMMUNITY

## 2021-08-20 RX ORDER — CHROMIUM PICOLINATE 200 MCG
1 TABLET ORAL DAILY
COMMUNITY
End: 2022-05-13

## 2021-08-20 NOTE — PERIOP NOTES
PLEASE CONTINUE TAKING ALL PRESCRIPTION MEDICATIONS UP TO THE DAY OF SURGERY UNLESS OTHERWISE DIRECTED BELOW. DISCONTINUE all vitamins and supplements 7 days prior to surgery. DISCONTINUE Non-Steriodal Anti-Inflammatory (NSAIDS) such as Advil and Aleve 5 days prior to surgery. Home Medications to take  the day of surgery   None            Home Medications   to Hold   Stop vitamins, supplements and herbals now         Comments    Covid test @ [facility],[address], [place]- Call [phone] to schedule an appointment for Covid test. Test must be done no sooner than 4 days prior to surgery. On the day before surgery please take Acetaminophen 1000mg in the morning and then again before bed. You may substitute for Tylenol 650 mg. Please do not bring home medications with you on the day of surgery unless otherwise directed by your nurse. If you are instructed to bring home medications, please give them to your nurse as they will be administered by the nursing staff. If you have any questions, please call Nagi Meneses [phone]. A copy of this note was provided to the patient for reference.

## 2021-08-20 NOTE — PERIOP NOTES
Patient verified name and . Order for consent not found in EHR. Type 3 surgery, PAT walk in assessment complete. Labs per surgeon: no orders received. Labs per anesthesia protocol: CBC,BMP; results pending. EKG: none needed per anesthesia guidelines. Patient COVID test date not found in EHR; Patient aware test has to be done no sooner than 4 days prior to surgery. [facility] [place]  and telephone number [phone] provided to the patient. MRSA/MSSA swab collected; pharmacy to review and dose antibiotic as appropriate. Hospital approved surgical skin cleanser and instructions to return bottle on DOS given per hospital policy. Patient provided with handouts including Guide to Surgery, Pain Management, Hand Hygiene, Blood Transfusion Education, and [facility] Brochure. Patient answered medical/surgical history questions at their best of ability. All prior to admission medications documented in [facility]. Original medication prescription bottle not visualized during patient appointment. Patient instructed to hold all vitamins 1 week prior to surgery and NSAIDS 5 days prior to surgery. Patient teach back successful and patient demonstrates knowledge of instruction.

## 2021-08-23 NOTE — ADVANCED PRACTICE NURSE
Total Joint Surgery Preoperative Chart Review      Patient ID:  Anival Ac  [id number]  30 y.o.  1965  Surgeon: Dr Efren Gonzales  Date of Surgery: 8/27/2021  Procedure: Total Right Shoulder Arthroplasty  Primary Care Physician: Yessenia Montez MD None  Specialty Physician(s):      Subjective:   Anival Ac is a 48 y.o. BLACK/ male who presents for preoperative evaluation for Total Right Shoulder arthroplasty. This is a preoperative chart review note based on data collected by the nurse at the surgical Pre-Assessment visit. Past Medical History:   Diagnosis Date    Arthritis     GERD (gastroesophageal reflux disease)     History of       Past Surgical History:   Procedure Laterality Date    HX ORTHOPAEDIC  late 1990's    \"torn ligament\" \"reconstruction\" both knees    HX ROTATOR CUFF REPAIR Right 2020     Family History   Problem Relation Age of Onset    Heart Disease Mother       Social History     Tobacco Use    Smoking status: Never Smoker    Smokeless tobacco: Never Used   Substance Use Topics    Alcohol use: Yes     Alcohol/week: 16.0 standard drinks     Types: 14 Cans of beer, 2 Shots of liquor per week       Prior to Admission medications    Medication Sig Start Date End Date Taking? Authorizing Provider   ashwagandha root extract 300 mg cap Take 1 Tablet by mouth daily. Yes Provider, Historical   saw-vit E-sod ok-lyc-beta-pyg (Prostate Health) 160-100-100 mg-unit-mcg tab Take 1 Tablet by mouth daily. Yes Provider, Historical   multivitamin (ONE A DAY) tablet Take 1 Tab by mouth daily.  3/8/18  Yes Bobo Chinchilla MD   HYDROcodone-acetaminophen (NORCO) 7.5-325 mg per tablet hydrocodone 7.5 mg-acetaminophen 325 mg tablet   TAKE 1 TABLET BY MOUTH EVERY 4 TO 6 HOURS AS NEEDED  Patient not taking: Reported on 8/20/2021    Provider, Historical   oxyCODONE IR (ROXICODONE) 5 mg immediate release tablet oxycodone 5 mg tablet   TAKE 1 TABLET BY MOUTH EVERY 4 TO 6 HOURS AS NEEDED  Patient not taking: Reported on 8/20/2021    Provider, Historical   promethazine (PHENERGAN) 25 mg tablet Take 1 Tab by mouth every six (6) hours as needed for Nausea. Patient not taking: Reported on 8/20/2021 8/22/20   Naye Balderas MD   ondansetron (Zofran ODT) 4 mg disintegrating tablet Take 1 Tab by mouth every eight (8) hours as needed for Nausea. Patient not taking: Reported on 8/20/2021 8/22/20   Naye Balderas MD   diclofenac EC (VOLTAREN) 75 mg EC tablet Take 75 mg by mouth two (2) times a day. Patient not taking: Reported on 8/20/2021    Provider, Historical     No Known Allergies       Objective:     Physical Exam:   No data found. ECG:    EKG Results     None          Data Review:   Labs:   Labs reviewed    Problem List:  )  Patient Active Problem List   Diagnosis Code    IGT (impaired glucose tolerance) R73.02    Elevated PSA, less than 10 ng/ml R97.20    Overweight (BMI 25.0-29. 9) E66.3    Hypercholesterolemia E78.00    Chronic prostatitis N41.1    Osteoarthritis of right shoulder M19.011    Right rotator cuff tear arthropathy M75.101, M12.811    Strain of muscle(s) and tendon(s) of the rotator cuff of right shoulder, sequela S46.011S    Superior glenoid labrum lesion of right shoulder S43.431A       Total Joint Surgery Pre-Assessment Recommendations:           Continuous saturation monitoring UPON ARRIVAL TO FLOOR. Heated high flow lung expansion x 24 hours and prn.     Signed By: ALEXIS Rudolph    August 23, 2021

## 2021-08-26 ENCOUNTER — ANESTHESIA EVENT (OUTPATIENT)
Dept: SURGERY | Age: 56
DRG: 483 | End: 2021-08-26

## 2021-08-26 NOTE — DISCHARGE INSTRUCTIONS
Total Shoulder Replacement Postoperative Instructions    Returning Home    Care of your incisions: You have absorbable stitches which do not need to be removed. Your incision will be checked at your first visit. 1) Moderate bleeding may occur at the incision sites. This should decrease quickly over time. 2) Leave the dressings in place for 5-7 days. Then dressings may be removed and replaced with fresh gauze if there is any drainage. You may bath or shower but the incisions must be kept clean and dry. You may take your arm out of the sling for showering or bathing but being careful to avoid use of the arm. You may let your arm also hang at your side during showering or at your side during bathing. 3) Watch the wound for increasing redness, tenderness, swelling and pus drainage daily. These can be early signs of infection. Please communicate any concerns. 4) A slight temperature the first few days after surgery is not uncommon. This can be treated with deep breathing and coughing to clear the lungs. However, fevers (anything over 101°F), increasing pain, and swelling at the incisions should be reported immediately. 5) Keep the wounds dry until your first follow-up visit. 6) It is fine to loosen the sling and do elbow straightening and bending with the arm at the side. It is also good to move the wrist and hand. This can be done as much as you desire, but at least three times a day. You are to wear sling at all times except when doing the exercises as above and showers. 7) Deep vein thrombosis (DVT) is a condition in which a blood clot has formed in a deep vein of the leg or arm. This may result in redness, swelling, warmth and/or pain of the leg/arm. Although these are very rare, there are things that can be done to lessen the risk.   It is recommended by your surgeon unless indicated otherwise, after arthroscopy; take an adult aspirin once a day for three weeks after surgery to help prevent the formation of blood clots. (Do not take aspirin against the advice of your medical doctor). Pain Management:    Pain after the surgery will vary from patient to patient. A certain amount of discomfort is normal and should not be alarming. We do recommend starting your prescription pain medications once you begin to experience a moderate amount of pain. If no relief is obtained call the office number on this sheet. Many pain medications contain Tylenol. Do not take additional Tylenol while taking the prescribed pain medication. Pain medications can cause constipation, so keep hydrated and consider over the counter additions like Metamucil or stool softener. Pain medications and antibiotics that are given during the meghana-operative time can cause itching and hives; over the counter Benadryl (25mg every 6 hours) can be helpful in treating this. If your pain is not adequately controlled, contact your physician at the numbers on this sheet. For the first week:  1) Icing for 15 minutes at a time may reduce your pain. Allow at least 30 minutes between ice applications. Some patients may have a cryotherapy cooler which can be used as it was during your stay at the hospital. It is fine to wear a light shirt underneath if needed to prevent skin irritation. 2) Sleeping might be difficult. Some individuals find sleeping in a recliner or inclined with pillows or a foam wedge helpful. 3) Putting a mitten on the hand of the affected shoulder can be helpful since some individuals find that warm hands help decrease pain. During this time nausea and light-headedness are common and should improve in 2-5 days. Drinking fluids and eating may help. If nausea persists, medicine can be prescribed by calling your doctor on the number(s) listed. Follow-up visits  Doctor  Plan on seeing your surgeon 10 days or so after surgery.     Physical Therapy   Physical therapy will be set up on an individualized basis. ? You will want to line up a helper to assist you with your home therapy program for approximately the first 6 weeks. Optimally, this person can come to your first physical therapy visit to have the physical therapist show them how to do the home therapy. The home therapy exercises will need to be done daily for approximately 20 minutes. Your helper doesnt need any medical training; the therapist will show them what they need to know. If you call the office please have us paged instead of leaving a voice mail so that we can immediately take care of your needs.     [address]                 Phone [phone]  [place] [address]                 Fax [phone]            Miguel Wright

## 2021-08-27 ENCOUNTER — APPOINTMENT (OUTPATIENT)
Dept: GENERAL RADIOLOGY | Age: 56
DRG: 483 | End: 2021-08-27
Attending: ORTHOPAEDIC SURGERY

## 2021-08-27 ENCOUNTER — ANESTHESIA (OUTPATIENT)
Dept: SURGERY | Age: 56
DRG: 483 | End: 2021-08-27

## 2021-08-27 ENCOUNTER — HOSPITAL ENCOUNTER (INPATIENT)
Age: 56
LOS: 1 days | Discharge: HOME | DRG: 483 | End: 2021-08-28
Attending: ORTHOPAEDIC SURGERY | Admitting: ORTHOPAEDIC SURGERY
Payer: OTHER MISCELLANEOUS

## 2021-08-27 DIAGNOSIS — M75.21 BICIPITAL TENDINITIS OF RIGHT SHOULDER: ICD-10-CM

## 2021-08-27 PROBLEM — M19.019 SHOULDER ARTHRITIS: Status: ACTIVE | Noted: 2021-08-27

## 2021-08-27 PROBLEM — M67.911 TENDINOPATHY OF RIGHT ROTATOR CUFF: Status: ACTIVE | Noted: 2021-08-27

## 2021-08-27 LAB
ABO + RH BLD: NORMAL
BLOOD GROUP ANTIBODIES SERPL: NORMAL
EST. AVERAGE GLUCOSE BLD GHB EST-MCNC: 117 MG/DL
GLUCOSE BLD STRIP.AUTO-MCNC: 89 MG/DL (ref 65–100)
HBA1C MFR BLD: 5.7 % (ref 4.2–6.3)
HCT VFR BLD AUTO: 41.9 % (ref 41.1–50.3)
HGB BLD-MCNC: 13.9 G/DL (ref 13.6–17.2)
SERVICE CMNT-IMP: NORMAL
SPECIMEN EXP DATE BLD: NORMAL

## 2021-08-27 PROCEDURE — 77030036638 HC ACC KT GPS KNE V2 EXAC -D: Performed by: ORTHOPAEDIC SURGERY

## 2021-08-27 PROCEDURE — 77030019908 HC STETH ESOPH SIMS -A: Performed by: ANESTHESIOLOGY

## 2021-08-27 PROCEDURE — 77030037088 HC TUBE ENDOTRACH ORAL NSL COVD-A: Performed by: ANESTHESIOLOGY

## 2021-08-27 PROCEDURE — 65270000029 HC RM PRIVATE

## 2021-08-27 PROCEDURE — 85014 HEMATOCRIT: CPT

## 2021-08-27 PROCEDURE — 23430 REPAIR BICEPS TENDON: CPT | Performed by: ORTHOPAEDIC SURGERY

## 2021-08-27 PROCEDURE — 77030040922 HC BLNKT HYPOTHRM STRY -A: Performed by: ANESTHESIOLOGY

## 2021-08-27 PROCEDURE — 77030039147 HC PWDR HEMSTS SURGICEL JNJ -D: Performed by: ORTHOPAEDIC SURGERY

## 2021-08-27 PROCEDURE — 77030039425 HC BLD LARYNG TRULITE DISP TELE -A: Performed by: ANESTHESIOLOGY

## 2021-08-27 PROCEDURE — 77030039266 HC ADH SKN EXOFIN S2SG -A: Performed by: ORTHOPAEDIC SURGERY

## 2021-08-27 PROCEDURE — 77030021668 HC NEB PREFIL KT VYRM -A

## 2021-08-27 PROCEDURE — 74011250636 HC RX REV CODE- 250/636: Performed by: ANESTHESIOLOGY

## 2021-08-27 PROCEDURE — 74011250636 HC RX REV CODE- 250/636: Performed by: PHYSICIAN ASSISTANT

## 2021-08-27 PROCEDURE — 77030040830 HC CATH URETH FOL MDII -A: Performed by: ORTHOPAEDIC SURGERY

## 2021-08-27 PROCEDURE — 77030005515 HC CATH URETH FOL14 BARD -B: Performed by: ORTHOPAEDIC SURGERY

## 2021-08-27 PROCEDURE — 76210000016 HC OR PH I REC 1 TO 1.5 HR: Performed by: ORTHOPAEDIC SURGERY

## 2021-08-27 PROCEDURE — 94762 N-INVAS EAR/PLS OXIMTRY CONT: CPT

## 2021-08-27 PROCEDURE — 77030033138 HC SUT PGA STRATFX J&J -B: Performed by: ORTHOPAEDIC SURGERY

## 2021-08-27 PROCEDURE — 74011000250 HC RX REV CODE- 250: Performed by: ANESTHESIOLOGY

## 2021-08-27 PROCEDURE — 0RRJ0JZ REPLACEMENT OF RIGHT SHOULDER JOINT WITH SYNTHETIC SUBSTITUTE, OPEN APPROACH: ICD-10-PCS | Performed by: ORTHOPAEDIC SURGERY

## 2021-08-27 PROCEDURE — 76060000040 HC ANESTHESIA 4.5 TO 5 HR: Performed by: ORTHOPAEDIC SURGERY

## 2021-08-27 PROCEDURE — 77030011208: Performed by: ORTHOPAEDIC SURGERY

## 2021-08-27 PROCEDURE — 74011250636 HC RX REV CODE- 250/636: Performed by: NURSE ANESTHETIST, CERTIFIED REGISTERED

## 2021-08-27 PROCEDURE — 77030018673: Performed by: ORTHOPAEDIC SURGERY

## 2021-08-27 PROCEDURE — 74011000258 HC RX REV CODE- 258: Performed by: ANESTHESIOLOGY

## 2021-08-27 PROCEDURE — 77030006835 HC BLD SAW SAG STRY -B: Performed by: ORTHOPAEDIC SURGERY

## 2021-08-27 PROCEDURE — 77030014006 HC SPNG HEMSTAT J&J -A: Performed by: ORTHOPAEDIC SURGERY

## 2021-08-27 PROCEDURE — 76010000136 HC OR TIME 4.5 TO 5 HR: Performed by: ORTHOPAEDIC SURGERY

## 2021-08-27 PROCEDURE — 2709999900 HC NON-CHARGEABLE SUPPLY: Performed by: ORTHOPAEDIC SURGERY

## 2021-08-27 PROCEDURE — C1776 JOINT DEVICE (IMPLANTABLE): HCPCS | Performed by: ORTHOPAEDIC SURGERY

## 2021-08-27 PROCEDURE — 76010010054 HC POST OP PAIN BLOCK: Performed by: ORTHOPAEDIC SURGERY

## 2021-08-27 PROCEDURE — 82962 GLUCOSE BLOOD TEST: CPT

## 2021-08-27 PROCEDURE — 77030002934 HC SUT MCRYL J&J -B: Performed by: ORTHOPAEDIC SURGERY

## 2021-08-27 PROCEDURE — 77030031139 HC SUT VCRL2 J&J -A: Performed by: ORTHOPAEDIC SURGERY

## 2021-08-27 PROCEDURE — 86901 BLOOD TYPING SEROLOGIC RH(D): CPT

## 2021-08-27 PROCEDURE — 73030 X-RAY EXAM OF SHOULDER: CPT

## 2021-08-27 PROCEDURE — 77010033711 HC HIGH FLOW OXYGEN

## 2021-08-27 PROCEDURE — 77030008841 HC WRE K MCRA -A: Performed by: ORTHOPAEDIC SURGERY

## 2021-08-27 PROCEDURE — 77030011283 HC ELECTRD NDL COVD -A: Performed by: ORTHOPAEDIC SURGERY

## 2021-08-27 PROCEDURE — 74011000250 HC RX REV CODE- 250: Performed by: NURSE ANESTHETIST, CERTIFIED REGISTERED

## 2021-08-27 PROCEDURE — 77030002922 HC SUT FBRWRE ARTH -B: Performed by: ORTHOPAEDIC SURGERY

## 2021-08-27 PROCEDURE — 77030006777 HC BLD SAW OSC CNMD -B: Performed by: ORTHOPAEDIC SURGERY

## 2021-08-27 PROCEDURE — 77030018547 HC SUT ETHBND1 J&J -B: Performed by: ORTHOPAEDIC SURGERY

## 2021-08-27 PROCEDURE — 77030002933 HC SUT MCRYL J&J -A: Performed by: ORTHOPAEDIC SURGERY

## 2021-08-27 PROCEDURE — 77030003602 HC NDL NRV BLK BBMI -B: Performed by: ANESTHESIOLOGY

## 2021-08-27 PROCEDURE — 74011250636 HC RX REV CODE- 250/636: Performed by: ORTHOPAEDIC SURGERY

## 2021-08-27 PROCEDURE — 83036 HEMOGLOBIN GLYCOSYLATED A1C: CPT

## 2021-08-27 PROCEDURE — 74011250637 HC RX REV CODE- 250/637: Performed by: ANESTHESIOLOGY

## 2021-08-27 PROCEDURE — 77030040361 HC SLV COMPR DVT MDII -B: Performed by: ORTHOPAEDIC SURGERY

## 2021-08-27 PROCEDURE — C1713 ANCHOR/SCREW BN/BN,TIS/BN: HCPCS | Performed by: ORTHOPAEDIC SURGERY

## 2021-08-27 PROCEDURE — 23472 RECONSTRUCT SHOULDER JOINT: CPT | Performed by: ORTHOPAEDIC SURGERY

## 2021-08-27 PROCEDURE — 0LS30ZZ REPOSITION RIGHT UPPER ARM TENDON, OPEN APPROACH: ICD-10-PCS | Performed by: ORTHOPAEDIC SURGERY

## 2021-08-27 PROCEDURE — L3670 SO ACRO/CLAV CAN WEB PRE OTS: HCPCS | Performed by: ORTHOPAEDIC SURGERY

## 2021-08-27 PROCEDURE — 74011250637 HC RX REV CODE- 250/637: Performed by: PHYSICIAN ASSISTANT

## 2021-08-27 PROCEDURE — 76942 ECHO GUIDE FOR BIOPSY: CPT | Performed by: ORTHOPAEDIC SURGERY

## 2021-08-27 PROCEDURE — 74011000250 HC RX REV CODE- 250: Performed by: ORTHOPAEDIC SURGERY

## 2021-08-27 DEVICE — SCREW BNE AD PED L47MM DIA15MM CANC SHLDR NONLOCKING: Type: IMPLANTABLE DEVICE | Site: SHOULDER | Status: FUNCTIONAL

## 2021-08-27 DEVICE — HEAD HUM DIA47MM SHT SHLDR FOR HEMIARTHROPLASTY EQUINOXE: Type: IMPLANTABLE DEVICE | Site: SHOULDER | Status: FUNCTIONAL

## 2021-08-27 DEVICE — SHOULDER S1 TOT STD ANAT -- IMPL CAPPED S1: Type: IMPLANTABLE DEVICE | Status: FUNCTIONAL

## 2021-08-27 DEVICE — PLATE BNE 4.5MM ANAT REPLICATOR O/S EQUINOXE: Type: IMPLANTABLE DEVICE | Site: SHOULDER | Status: FUNCTIONAL

## 2021-08-27 DEVICE — STEM HUM DIA13MM SHLDR PRI PRESSFIT EQUINOXE: Type: IMPLANTABLE DEVICE | Site: SHOULDER | Status: FUNCTIONAL

## 2021-08-27 DEVICE — CEMENT BNE 20ML 40GM FULL DOSE PMMA W/O ANTIBIO M VISC: Type: IMPLANTABLE DEVICE | Site: SHOULDER | Status: FUNCTIONAL

## 2021-08-27 RX ORDER — EPINEPHRINE 1 MG/ML
INJECTION INTRAMUSCULAR; INTRAVENOUS; SUBCUTANEOUS AS NEEDED
Status: DISCONTINUED | OUTPATIENT
Start: 2021-08-27 | End: 2021-08-27 | Stop reason: HOSPADM

## 2021-08-27 RX ORDER — NEOSTIGMINE METHYLSULFATE 1 MG/ML
INJECTION, SOLUTION INTRAVENOUS AS NEEDED
Status: DISCONTINUED | OUTPATIENT
Start: 2021-08-27 | End: 2021-08-27 | Stop reason: HOSPADM

## 2021-08-27 RX ORDER — FENTANYL CITRATE 50 UG/ML
INJECTION, SOLUTION INTRAMUSCULAR; INTRAVENOUS AS NEEDED
Status: DISCONTINUED | OUTPATIENT
Start: 2021-08-27 | End: 2021-08-27 | Stop reason: HOSPADM

## 2021-08-27 RX ORDER — BUPIVACAINE HYDROCHLORIDE AND EPINEPHRINE 5; 5 MG/ML; UG/ML
INJECTION, SOLUTION EPIDURAL; INTRACAUDAL; PERINEURAL
Status: COMPLETED | OUTPATIENT
Start: 2021-08-27 | End: 2021-08-27

## 2021-08-27 RX ORDER — SODIUM CHLORIDE 0.9 % (FLUSH) 0.9 %
5-40 SYRINGE (ML) INJECTION EVERY 8 HOURS
Status: DISCONTINUED | OUTPATIENT
Start: 2021-08-27 | End: 2021-08-28 | Stop reason: HOSPADM

## 2021-08-27 RX ORDER — MIDAZOLAM HYDROCHLORIDE 1 MG/ML
2 INJECTION, SOLUTION INTRAMUSCULAR; INTRAVENOUS
Status: DISCONTINUED | OUTPATIENT
Start: 2021-08-27 | End: 2021-08-27 | Stop reason: HOSPADM

## 2021-08-27 RX ORDER — DEXAMETHASONE SODIUM PHOSPHATE 4 MG/ML
INJECTION, SOLUTION INTRA-ARTICULAR; INTRALESIONAL; INTRAMUSCULAR; INTRAVENOUS; SOFT TISSUE AS NEEDED
Status: DISCONTINUED | OUTPATIENT
Start: 2021-08-27 | End: 2021-08-27 | Stop reason: HOSPADM

## 2021-08-27 RX ORDER — VANCOMYCIN HYDROCHLORIDE 1 G/20ML
INJECTION, POWDER, LYOPHILIZED, FOR SOLUTION INTRAVENOUS AS NEEDED
Status: DISCONTINUED | OUTPATIENT
Start: 2021-08-27 | End: 2021-08-27 | Stop reason: HOSPADM

## 2021-08-27 RX ORDER — ASPIRIN 325 MG
325 TABLET, DELAYED RELEASE (ENTERIC COATED) ORAL DAILY
Status: DISCONTINUED | OUTPATIENT
Start: 2021-08-28 | End: 2021-08-28 | Stop reason: HOSPADM

## 2021-08-27 RX ORDER — SODIUM CHLORIDE 9 MG/ML
100 INJECTION, SOLUTION INTRAVENOUS CONTINUOUS
Status: DISCONTINUED | OUTPATIENT
Start: 2021-08-27 | End: 2021-08-28 | Stop reason: HOSPADM

## 2021-08-27 RX ORDER — ONDANSETRON 2 MG/ML
4 INJECTION INTRAMUSCULAR; INTRAVENOUS
Status: DISCONTINUED | OUTPATIENT
Start: 2021-08-27 | End: 2021-08-28 | Stop reason: HOSPADM

## 2021-08-27 RX ORDER — HYDROMORPHONE HYDROCHLORIDE 1 MG/ML
0.5 INJECTION, SOLUTION INTRAMUSCULAR; INTRAVENOUS; SUBCUTANEOUS
Status: DISCONTINUED | OUTPATIENT
Start: 2021-08-27 | End: 2021-08-27 | Stop reason: HOSPADM

## 2021-08-27 RX ORDER — MIDAZOLAM HYDROCHLORIDE 1 MG/ML
2 INJECTION, SOLUTION INTRAMUSCULAR; INTRAVENOUS ONCE
Status: COMPLETED | OUTPATIENT
Start: 2021-08-27 | End: 2021-08-27

## 2021-08-27 RX ORDER — NALOXONE HYDROCHLORIDE 0.4 MG/ML
0.4 INJECTION, SOLUTION INTRAMUSCULAR; INTRAVENOUS; SUBCUTANEOUS
Status: DISCONTINUED | OUTPATIENT
Start: 2021-08-27 | End: 2021-08-28 | Stop reason: HOSPADM

## 2021-08-27 RX ORDER — DEXAMETHASONE SODIUM PHOSPHATE 4 MG/ML
INJECTION, SOLUTION INTRA-ARTICULAR; INTRALESIONAL; INTRAMUSCULAR; INTRAVENOUS; SOFT TISSUE
Status: COMPLETED | OUTPATIENT
Start: 2021-08-27 | End: 2021-08-27

## 2021-08-27 RX ORDER — SODIUM CHLORIDE, SODIUM LACTATE, POTASSIUM CHLORIDE, CALCIUM CHLORIDE 600; 310; 30; 20 MG/100ML; MG/100ML; MG/100ML; MG/100ML
100 INJECTION, SOLUTION INTRAVENOUS CONTINUOUS
Status: DISCONTINUED | OUTPATIENT
Start: 2021-08-27 | End: 2021-08-27 | Stop reason: HOSPADM

## 2021-08-27 RX ORDER — OXYCODONE HYDROCHLORIDE 5 MG/1
10 TABLET ORAL
Status: DISCONTINUED | OUTPATIENT
Start: 2021-08-27 | End: 2021-08-27 | Stop reason: HOSPADM

## 2021-08-27 RX ORDER — LIDOCAINE HYDROCHLORIDE 10 MG/ML
0.1 INJECTION INFILTRATION; PERINEURAL AS NEEDED
Status: DISCONTINUED | OUTPATIENT
Start: 2021-08-27 | End: 2021-08-27 | Stop reason: HOSPADM

## 2021-08-27 RX ORDER — CEFAZOLIN SODIUM/WATER 2 G/20 ML
2 SYRINGE (ML) INTRAVENOUS ONCE
Status: COMPLETED | OUTPATIENT
Start: 2021-08-27 | End: 2021-08-27

## 2021-08-27 RX ORDER — OXYCODONE AND ACETAMINOPHEN 7.5; 325 MG/1; MG/1
1-2 TABLET ORAL
Status: DISCONTINUED | OUTPATIENT
Start: 2021-08-27 | End: 2021-08-28 | Stop reason: HOSPADM

## 2021-08-27 RX ORDER — AMOXICILLIN 250 MG
1 CAPSULE ORAL DAILY
Status: DISCONTINUED | OUTPATIENT
Start: 2021-08-28 | End: 2021-08-28 | Stop reason: HOSPADM

## 2021-08-27 RX ORDER — FENTANYL CITRATE 50 UG/ML
100 INJECTION, SOLUTION INTRAMUSCULAR; INTRAVENOUS ONCE
Status: COMPLETED | OUTPATIENT
Start: 2021-08-27 | End: 2021-08-27

## 2021-08-27 RX ORDER — ROCURONIUM BROMIDE 10 MG/ML
INJECTION, SOLUTION INTRAVENOUS AS NEEDED
Status: DISCONTINUED | OUTPATIENT
Start: 2021-08-27 | End: 2021-08-27 | Stop reason: HOSPADM

## 2021-08-27 RX ORDER — SODIUM CHLORIDE 0.9 % (FLUSH) 0.9 %
5-40 SYRINGE (ML) INJECTION AS NEEDED
Status: DISCONTINUED | OUTPATIENT
Start: 2021-08-27 | End: 2021-08-28 | Stop reason: HOSPADM

## 2021-08-27 RX ORDER — DIPHENHYDRAMINE HCL 25 MG
25 CAPSULE ORAL
Status: DISCONTINUED | OUTPATIENT
Start: 2021-08-27 | End: 2021-08-28 | Stop reason: HOSPADM

## 2021-08-27 RX ORDER — EPHEDRINE SULFATE/0.9% NACL/PF 50 MG/5 ML
SYRINGE (ML) INTRAVENOUS AS NEEDED
Status: DISCONTINUED | OUTPATIENT
Start: 2021-08-27 | End: 2021-08-27 | Stop reason: HOSPADM

## 2021-08-27 RX ORDER — ROPIVACAINE HYDROCHLORIDE 5 MG/ML
INJECTION, SOLUTION EPIDURAL; INFILTRATION; PERINEURAL AS NEEDED
Status: DISCONTINUED | OUTPATIENT
Start: 2021-08-27 | End: 2021-08-27 | Stop reason: HOSPADM

## 2021-08-27 RX ORDER — LIDOCAINE HYDROCHLORIDE 20 MG/ML
INJECTION, SOLUTION EPIDURAL; INFILTRATION; INTRACAUDAL; PERINEURAL AS NEEDED
Status: DISCONTINUED | OUTPATIENT
Start: 2021-08-27 | End: 2021-08-27 | Stop reason: HOSPADM

## 2021-08-27 RX ORDER — PROPOFOL 10 MG/ML
INJECTION, EMULSION INTRAVENOUS AS NEEDED
Status: DISCONTINUED | OUTPATIENT
Start: 2021-08-27 | End: 2021-08-27 | Stop reason: HOSPADM

## 2021-08-27 RX ORDER — SODIUM CHLORIDE, SODIUM LACTATE, POTASSIUM CHLORIDE, CALCIUM CHLORIDE 600; 310; 30; 20 MG/100ML; MG/100ML; MG/100ML; MG/100ML
75 INJECTION, SOLUTION INTRAVENOUS CONTINUOUS
Status: DISCONTINUED | OUTPATIENT
Start: 2021-08-27 | End: 2021-08-27 | Stop reason: HOSPADM

## 2021-08-27 RX ORDER — GABAPENTIN 300 MG/1
300 CAPSULE ORAL ONCE
Status: COMPLETED | OUTPATIENT
Start: 2021-08-27 | End: 2021-08-27

## 2021-08-27 RX ORDER — ACETAMINOPHEN 325 MG/1
975 TABLET ORAL ONCE
Status: DISCONTINUED | OUTPATIENT
Start: 2021-08-27 | End: 2021-08-27

## 2021-08-27 RX ORDER — ONDANSETRON 2 MG/ML
INJECTION INTRAMUSCULAR; INTRAVENOUS AS NEEDED
Status: DISCONTINUED | OUTPATIENT
Start: 2021-08-27 | End: 2021-08-27 | Stop reason: HOSPADM

## 2021-08-27 RX ORDER — DIPHENHYDRAMINE HYDROCHLORIDE 50 MG/ML
12.5 INJECTION, SOLUTION INTRAMUSCULAR; INTRAVENOUS ONCE
Status: DISCONTINUED | OUTPATIENT
Start: 2021-08-27 | End: 2021-08-27 | Stop reason: HOSPADM

## 2021-08-27 RX ORDER — AMOXICILLIN 250 MG
1 CAPSULE ORAL
Status: DISCONTINUED | OUTPATIENT
Start: 2021-08-27 | End: 2021-08-28 | Stop reason: HOSPADM

## 2021-08-27 RX ORDER — CEFAZOLIN SODIUM/WATER 2 G/20 ML
2 SYRINGE (ML) INTRAVENOUS EVERY 8 HOURS
Status: COMPLETED | OUTPATIENT
Start: 2021-08-28 | End: 2021-08-28

## 2021-08-27 RX ORDER — HYDROMORPHONE HYDROCHLORIDE 1 MG/ML
.5-1 INJECTION, SOLUTION INTRAMUSCULAR; INTRAVENOUS; SUBCUTANEOUS
Status: DISCONTINUED | OUTPATIENT
Start: 2021-08-27 | End: 2021-08-28 | Stop reason: HOSPADM

## 2021-08-27 RX ORDER — NALOXONE HYDROCHLORIDE 0.4 MG/ML
0.1 INJECTION, SOLUTION INTRAMUSCULAR; INTRAVENOUS; SUBCUTANEOUS AS NEEDED
Status: DISCONTINUED | OUTPATIENT
Start: 2021-08-27 | End: 2021-08-27 | Stop reason: HOSPADM

## 2021-08-27 RX ORDER — GLYCOPYRROLATE 0.2 MG/ML
INJECTION INTRAMUSCULAR; INTRAVENOUS AS NEEDED
Status: DISCONTINUED | OUTPATIENT
Start: 2021-08-27 | End: 2021-08-27 | Stop reason: HOSPADM

## 2021-08-27 RX ORDER — ACETAMINOPHEN 500 MG
1000 TABLET ORAL ONCE
Status: COMPLETED | OUTPATIENT
Start: 2021-08-27 | End: 2021-08-27

## 2021-08-27 RX ORDER — PROMETHAZINE HYDROCHLORIDE 25 MG/1
25 TABLET ORAL
Status: DISCONTINUED | OUTPATIENT
Start: 2021-08-27 | End: 2021-08-28 | Stop reason: HOSPADM

## 2021-08-27 RX ORDER — OXYCODONE HYDROCHLORIDE 5 MG/1
5 TABLET ORAL
Status: DISCONTINUED | OUTPATIENT
Start: 2021-08-27 | End: 2021-08-27 | Stop reason: HOSPADM

## 2021-08-27 RX ORDER — ACETAMINOPHEN 650 MG/1
650 SUPPOSITORY RECTAL ONCE
Status: DISCONTINUED | OUTPATIENT
Start: 2021-08-27 | End: 2021-08-27

## 2021-08-27 RX ORDER — ONDANSETRON 2 MG/ML
4 INJECTION INTRAMUSCULAR; INTRAVENOUS ONCE
Status: DISCONTINUED | OUTPATIENT
Start: 2021-08-27 | End: 2021-08-27 | Stop reason: HOSPADM

## 2021-08-27 RX ADMIN — DEXAMETHASONE SODIUM PHOSPHATE 4 MG: 4 INJECTION, SOLUTION INTRAMUSCULAR; INTRAVENOUS at 11:56

## 2021-08-27 RX ADMIN — ROCURONIUM BROMIDE 10 MG: 10 INJECTION, SOLUTION INTRAVENOUS at 14:12

## 2021-08-27 RX ADMIN — Medication 10 ML: at 20:38

## 2021-08-27 RX ADMIN — Medication 10 MG: at 16:25

## 2021-08-27 RX ADMIN — FENTANYL CITRATE 100 MCG: 50 INJECTION INTRAMUSCULAR; INTRAVENOUS at 12:35

## 2021-08-27 RX ADMIN — ACETAMINOPHEN 1000 MG: 500 TABLET, FILM COATED ORAL at 11:07

## 2021-08-27 RX ADMIN — Medication 3 AMPULE: at 11:07

## 2021-08-27 RX ADMIN — LIDOCAINE HYDROCHLORIDE 80 MG: 20 INJECTION, SOLUTION EPIDURAL; INFILTRATION; INTRACAUDAL; PERINEURAL at 12:35

## 2021-08-27 RX ADMIN — DEXAMETHASONE SODIUM PHOSPHATE 10 MG: 4 INJECTION, SOLUTION INTRA-ARTICULAR; INTRALESIONAL; INTRAMUSCULAR; INTRAVENOUS; SOFT TISSUE at 12:54

## 2021-08-27 RX ADMIN — Medication 4 MG: at 16:51

## 2021-08-27 RX ADMIN — LIDOCAINE HYDROCHLORIDE 0.1 ML: 10 INJECTION, SOLUTION INFILTRATION; PERINEURAL at 11:28

## 2021-08-27 RX ADMIN — CEFAZOLIN 2 G: 1 INJECTION, POWDER, FOR SOLUTION INTRAVENOUS at 16:29

## 2021-08-27 RX ADMIN — GLYCOPYRROLATE 0.6 MG: 0.2 INJECTION, SOLUTION INTRAMUSCULAR; INTRAVENOUS at 16:51

## 2021-08-27 RX ADMIN — MIDAZOLAM 2 MG: 1 INJECTION INTRAMUSCULAR; INTRAVENOUS at 11:52

## 2021-08-27 RX ADMIN — GABAPENTIN 300 MG: 300 CAPSULE ORAL at 11:07

## 2021-08-27 RX ADMIN — CEFAZOLIN 2 G: 1 INJECTION, POWDER, FOR SOLUTION INTRAVENOUS at 12:29

## 2021-08-27 RX ADMIN — SODIUM CHLORIDE, SODIUM LACTATE, POTASSIUM CHLORIDE, AND CALCIUM CHLORIDE: 600; 310; 30; 20 INJECTION, SOLUTION INTRAVENOUS at 12:29

## 2021-08-27 RX ADMIN — ONDANSETRON 4 MG: 2 INJECTION INTRAMUSCULAR; INTRAVENOUS at 14:17

## 2021-08-27 RX ADMIN — PHENYLEPHRINE HYDROCHLORIDE 50 MCG: 10 INJECTION INTRAVENOUS at 13:06

## 2021-08-27 RX ADMIN — SODIUM CHLORIDE 20 MCG/MIN: 900 INJECTION, SOLUTION INTRAVENOUS at 13:08

## 2021-08-27 RX ADMIN — PROPOFOL 170 MG: 10 INJECTION, EMULSION INTRAVENOUS at 12:35

## 2021-08-27 RX ADMIN — ROCURONIUM BROMIDE 10 MG: 10 INJECTION, SOLUTION INTRAVENOUS at 15:22

## 2021-08-27 RX ADMIN — BUPIVACAINE HYDROCHLORIDE AND EPINEPHRINE BITARTRATE 30 ML: 5; .005 INJECTION, SOLUTION EPIDURAL; INTRACAUDAL; PERINEURAL at 11:56

## 2021-08-27 RX ADMIN — SODIUM CHLORIDE, SODIUM LACTATE, POTASSIUM CHLORIDE, AND CALCIUM CHLORIDE 100 ML/HR: 600; 310; 30; 20 INJECTION, SOLUTION INTRAVENOUS at 11:28

## 2021-08-27 RX ADMIN — ROCURONIUM BROMIDE 10 MG: 10 INJECTION, SOLUTION INTRAVENOUS at 13:36

## 2021-08-27 RX ADMIN — Medication 10 MG: at 16:03

## 2021-08-27 RX ADMIN — TRANEXAMIC ACID 1 G: 100 INJECTION, SOLUTION INTRAVENOUS at 12:46

## 2021-08-27 RX ADMIN — FENTANYL CITRATE 100 MCG: 50 INJECTION, SOLUTION INTRAMUSCULAR; INTRAVENOUS at 11:52

## 2021-08-27 RX ADMIN — Medication 10 MG: at 12:58

## 2021-08-27 RX ADMIN — ROCURONIUM BROMIDE 50 MG: 10 INJECTION, SOLUTION INTRAVENOUS at 12:36

## 2021-08-27 RX ADMIN — SODIUM CHLORIDE, SODIUM LACTATE, POTASSIUM CHLORIDE, AND CALCIUM CHLORIDE: 600; 310; 30; 20 INJECTION, SOLUTION INTRAVENOUS at 13:48

## 2021-08-27 RX ADMIN — ROCURONIUM BROMIDE 10 MG: 10 INJECTION, SOLUTION INTRAVENOUS at 14:55

## 2021-08-27 NOTE — H&P
History and Physical    Patient: Kathy Khan MRN: [id number]  SSN: [id number]    YOB: 1965  Age: 48 y.o. Sex: male      Subjective:      Kathy Khan is a 48 y.o. male who Has chronic right shoulder pain and weakness. Patient has been extensively worked up with MRI and CT previously and was diagnosed with arthritis and rotator cuff tendinopathy as well as some degenerative labral tearing and biceps tearing. He has undergone extensive conservative treatment including injection and activity modification. Over time he had exhausted all conservative measures and elected to proceed with shoulder replacement. Discussed based on tendinopathy of the rotator cuff we should be prepared for reverse shoulder replacement versus total shoulder replacement. .     Past Medical History:   Diagnosis Date    Arthritis     GERD (gastroesophageal reflux disease)     History of      Past Surgical History:   Procedure Laterality Date    HX ORTHOPAEDIC  late 1990's    \"torn ligament\" \"reconstruction\" both knees    HX ROTATOR CUFF REPAIR Right 2020      Family History   Problem Relation Age of Onset    Heart Disease Mother      Social History     Tobacco Use    Smoking status: Never Smoker    Smokeless tobacco: Never Used   Substance Use Topics    Alcohol use: Yes     Alcohol/week: 16.0 standard drinks     Types: 14 Cans of beer, 2 Shots of liquor per week      Prior to Admission medications    Medication Sig Start Date End Date Taking? Authorizing Provider   aspirin (ASPIRIN) 325 mg tablet Take 1 Tablet by mouth daily for 7 days. To start after surgery 8/26/21 9/2/21  Linden Sykes MD   senna-docusate (PERICOLACE) 8.6-50 mg per tablet Take 1 Tablet by mouth daily. To start after surgery  Indications: constipation 8/26/21   Linden Sykes MD   ondansetron (ZOFRAN ODT) 4 mg disintegrating tablet 1 Tablet by SubLINGual route every six (6) hours as needed for Nausea or Nausea or Vomiting.  To start after surgery  Indications: prevent nausea and vomiting after surgery 8/26/21   MD brenda Raza root extract 300 mg cap Take 1 Tablet by mouth daily. Provider, Historical   saw-vit E-sod ok-lyc-beta-pyg (Prostate Health) 160-100-100 mg-unit-mcg tab Take 1 Tablet by mouth daily. Provider, Historical   HYDROcodone-acetaminophen (NORCO) 7.5-325 mg per tablet hydrocodone 7.5 mg-acetaminophen 325 mg tablet   TAKE 1 TABLET BY MOUTH EVERY 4 TO 6 HOURS AS NEEDED  Patient not taking: Reported on 8/20/2021    Provider, Historical   oxyCODONE IR (ROXICODONE) 5 mg immediate release tablet oxycodone 5 mg tablet   TAKE 1 TABLET BY MOUTH EVERY 4 TO 6 HOURS AS NEEDED  Patient not taking: Reported on 8/20/2021    Provider, Historical   promethazine (PHENERGAN) 25 mg tablet Take 1 Tab by mouth every six (6) hours as needed for Nausea. Patient not taking: Reported on 8/20/2021 8/22/20   Graham Carrillo MD   ondansetron (Zofran ODT) 4 mg disintegrating tablet Take 1 Tab by mouth every eight (8) hours as needed for Nausea. Patient not taking: Reported on 8/20/2021 8/22/20   Graham Carrillo MD   diclofenac EC (VOLTAREN) 75 mg EC tablet Take 75 mg by mouth two (2) times a day. Patient not taking: Reported on 8/20/2021    Provider, Historical   multivitamin (ONE A DAY) tablet Take 1 Tab by mouth daily. 3/8/18   Daljit Chinchilla MD        No Known Allergies    Review of Systems:  Pertinent items are noted in the History of Present Illness. Objective:     Visit Vitals  BP (!) 115/57 (BP 1 Location: Left upper arm, BP Patient Position: At rest)   Pulse (!) 56   Temp 98.1 °F (36.7 °C)   Resp 16   Ht 6' 3\" (1.905 m)   Wt 217 lb (98.4 kg)   SpO2 99%   BMI 27.12 kg/m²         Physical Exam:    GEN: General appearance is that of a healthy patient, alert and oriented, in no distress. WDWN. HEENT:  Normocephalic, atraumatic. Extraocular muscles are intact. Neck:  Supple, no lymphadenopathy.   Heart:  Regular pulse exam on all extremities. Good color and warmth noted. Lungs:  Normal non-labored breathing with no obvious shortness of breath. Abdomen:  WNL's. Skin:  No signs of rash or bruising. Pysch: Answers questions appropriately, AO X 3. MSK:  Cervical spine: No tenderness. Normal active motion. - Spurling's maneuver.      SHOULDER   Right (involved)  Left   Skin Intact Intact   Radial Pulses 2+ symmetrical  2+ symmetrical   Myotomes Normal Normal   Dermatomes  Normal Normal   ROM Limited, flexion 85, abduction 70 external rotation 10. Full, flexion 155, abduction 120, external rotation 20   Strength 5-/5 throughout No weakness   Atrophy None noted None noted   Effusion/Swelling  No swelling None   Palpation Palpable deformity over the right AC joint likely a type III separation chronic No Tenderness   Bicep Tendon Rupture  - Negative   Bear Hug, Belly Press -,- Not tested   Crossed Arm Adduction Test Not tested Not tested   Instability/Ant. Apprehension Test None  None   Impingement Limited Negative              Right shoulder MRI reviewed from 2/25/2021 showing advanced arthritis of the right shoulder with small effusion. Chronic tendinosis of the rotator cuff without tear. Circumferential degenerative labral tearing with biceps anchor injury and tendinosis of the biceps. Likely chronic in nature.     Right shoulder CT reviewed showing marked arthritic change of the glenohumeral joint with large marginal osteophytes. Subchondral erosions. Hardware noted in proximal humerus from previous surgery. 15 degree retroversion. [place] B2. No evidence of rotator cuff tear. Changes at the [facility] joint. 25 mm of glenoid bone vault  No significant posterior subluxation of the humerus.     Assessment:     Hospital Problems  Date Reviewed: 5/18/2021        Codes Class Noted POA    Tendinopathy of right rotator cuff ICD-10-CM: M67.911  ICD-9-CM: 727.9  8/27/2021 Yes        * (Principal) Arthritis of right shoulder region ICD-10-CM: M19.011  ICD-9-CM: 716.91  6/10/2021 Yes              Plan:     The patient desires a  Right reverse vs. total shoulder replacement and possible biceps subpectoral tenodesis, after they have exhausted all conservative options. I talked with them extensively about the risks, benefits, reasonable expectations and expected recovery time as well as possible complications including but not limited to bleeding, infection, wear of the components requiring revision, neurovascular injury, instability/dislocations, cosmetic deformity, stiffness, pain, continued problems, DVT, PE, hardware failure,  fracture, heterotopic ossification, MI and other anesthesia related risks, etc.   In the office I gave them education literature and answered their questions. They seem to understand and wish to proceed. The patient was counseled at length about the risks of abhishek Covid-19 during their perioperative period and any recovery window from their procedure. The patient was made aware that abhishek Covid-19  may worsen their prognosis for recovering from their procedure and lend to a higher morbidity and/or mortality risk. All material risks, benefits, and reasonable alternatives including postponing the procedure were discussed. The patient does  wish to proceed with the procedure at this time.       Signed By: Wyatt Santana MD     August 27, 2021

## 2021-08-27 NOTE — ANESTHESIA PROCEDURE NOTES
Peripheral Block    Start time: 8/27/2021 11:52 AM  End time: 8/27/2021 11:56 AM  Performed by: Seema Haile MD  Authorized by: Seema Haile MD       Pre-procedure: Indications: at surgeon's request and post-op pain management    Preanesthetic Checklist: patient identified, risks and benefits discussed, site marked, timeout performed, anesthesia consent given and patient being monitored    Timeout Time: 11:52 EDT          Block Type:   Block Type:   Interscalene  Laterality:  Right  Monitoring:  Frequent vital sign checks, heart rate, oxygen, continuous pulse ox and responsive to questions  Injection Technique:  Single shot  Procedures: ultrasound guided    Patient Position: supine  Prep: chlorhexidine    Location:  Interscalene  Needle Type:  Stimuplex  Needle Gauge:  20 G  Needle Localization:  Ultrasound guidance  Medication Injected:  Bupivacaine-EPINEPHrine (PF)(SENSORCAINE) 0.5%-1:200,000 mg injection, 30 mL  dexamethasone (DECADRON) 4 mg/mL injection, 4 mg  Med Admin Time: 8/27/2021 11:56 AM    Assessment:  Number of attempts:  1  Injection Assessment:  Incremental injection every 5 mL, negative aspiration for CSF, no paresthesia, ultrasound image on chart, no intravascular symptoms, negative aspiration for blood and local visualized surrounding nerve on ultrasound  Patient tolerance:  Patient tolerated the procedure well with no immediate complications

## 2021-08-27 NOTE — OP NOTES
Operative Note    Date of Surgery: 8/27/2021       Preoperative diagnosis:  Osteoarthritis of right shoulder, unspecified osteoarthritis type [M19.011]  Strain of muscle(s) and tendon(s) of the rotator cuff of right shoulder, sequela [S46.011S]    Postoperative diagnosis: Osteoarthritis of right shoulder, unspecified osteoarthritis type     Surgeon(s) and Role:     Diana Soriano MD - Primary    Assistant:     Anesthesia: General, regional block    Antibiotics: Ancef 2 grams IV , I gram vancomycin powder in soft tissues. Procedures:  Procedure(s):  RIGHT TOTAL SHOULDER ARTHROPLASTY & BICEP TENODESIS  right Total Shoulder arthroplasty [id number]  [id number]    Findings:  1. EUA -external rotation 15 degrees, flexion 85, abduction 75  2. Patient had severe glenohumeral arthritis with large very hard bony osteophytes on the inferior medial surface of the humeral head. Some of this extended more superiorly. The glenoid was severely eroded as well with more posterior glenoid wear and retroversion seen. The bone quality was very good. The rotator cuff tissue appeared to be intact with no sign of significant tearing seen superiorly. There are fair amount of adhesions over the superior cuff and underneath the deltoid that required debridement. The biceps tendon itself was fairly tendinopathic as well. The patient had a very anteverted humeral head in comparison to the normal patient population. Indications / Consent: This is a patient who has severe rightshoulder arthritis. After previous discussions and treatments using both conservative and/or non-operative treatment options the patient elected to proceed with surgery due to continued symptoms. A review of the risks and benefits, including but not limited to infection, stiffness, injury to nerves and vessels, DVT, PE, MI, need for further operations and other anesthesia related risks was performed with the patient.  After this review and the review of the likely outcome and potential complications of the procedure, preoperative verbal and written consents were obtained. The operative procedure and postoperative course were discussed with the patient in detail and the extremity was marked by the patient and myself. Procedure Details: The patient was given an anesthetic, placed semi sitting, the head was held in a neutral position, the legs were flexed and pillows were placed under the legs. A nova was placed. Txa 1 gram IV was given by anesthesia. An EUA was performed and noted above. They were then padded and the operative shoulder was prepped with ChloraPrep and draped in the usual fashion. Prior to the beginning of the procedure, a time-out was performed for correct surgical site identification as was marked during the pre-operative meeting. This was confirmed using the written consent and history/physical. Time-out for antibiotic dosing, timing and selection was also performed. The operative arm was connected to an arm banugra. The incision site was injected with a mixture of epi and Naropin. A longitudinal incision was made from the clavicle over the coracoid and down in the deltopectoral interval. Dissection was carried down through the deltopectoral interval with bovie cauterization as needed. The cephalic vein was identified and freed up and retracted laterally. Dissection was carried down to the clavipectoral fascia and conjoined tendon. The subacromial space was then opened and a retractor was then placed under the acromion and under the deltoid. The conjoined tendon was freed up from the subscapularis anteriorly. A finger was placed on the axillary nerve and this position was confirmed. At this point a tonsil was placed into the rotator interval and the area of the subscap was identified superiorly. The pectoralis tendon was then partially incised about 1-2 cm from its superior border.  The biceps tendon was then identified and released from the bicipital groove in an inferior to superior direction. The rotator interval was also released. The biceps tendon was then secured to the conjoint tendon with #2 non-absorbable suture and then tenodesed. The three sisters or vessels were cauterized and the subscapularis and capsule was resected away from the proximal humerus with progressive external rotation in an  intra-tendonous fashion. Two #2 non-absorbable sutures were then used to tag the upper and lower subscapularis tendon. The shoulder was then progressively externally rotated and subluxated anteriorly. Osteophytes were removed from the humeral head using a ronguer. The remaining proximal biceps was released from the glenoid. The rotator cuff attachment was confirmed and its attachment on the tuberosity was visualized. A posterior glenoid retractor was placed to push the proximal humerus posteriorly. The anterior and inferior capsule was then dissected from the subscapularis. The subscapularis was pulled up and with a finger on the axillary nerve the capsule on the deep side of the subscapularis was cut so that the subscapularis was freed from the anterior glenoid rim. The capsule was then cut at the inferior glenoid, again with care to protect the axillary nerve running posteriorly. Attention was then turned to the proximal humeral cut. With retraction around the proximal humerus a cut was made in the proximal humerus with the [employer] cutting guide to remove the articular surface. It was felt that this cut was well placed to the level, but not into the rotator cuff. After the cut had been performed any other further trimming of osteophytes was then accomplished. The subscap was partially peeled away from the lesser in order to identify if any further bone could be removed. It was felt that a few more millimeters could be removed and so the saw was used to repeat the cut.   The proximal humerus canal then had T handled reamers placed until a snug fit was felt in line with the axis of the humerus. At this point the appropriate broaches were then placed. The version was verified on the the handles and it was felt that appropriate version was present. The broach was then removed. At this point the canal was copiously irrigated. The vancomycin. Powder was placed partially in the shaft. A 13 mm stem was selected and hammered into position. A protective plate was then placed over the implant. After this had been done a Darrach retractor was placed on the proximal humerus levering on the posterior glenoid. After this had been done a posterior glenoid retractor was placed. The arm was externally rotated and extended and an anterior glenoid neck retractor was placed. Excessive labrum was removed at this point from around the glenoid. The glenoid was found to have hardly any cartilage. Soft tissue resection was performed around the glenoid and coracoid for the GPS tracking system preparation. The tracker was then placed on the coracoid and screwed into position with the two screws from the set. It was felt to have good purchase and was stable. The tracker tip was then used to sync the system. Once all points were obtained and felt to be a good representation of the anatomy the center drill was used. Using the targeting guide from the GPS system, the center hole was drilled according to the preoperative plan. The gps reamer was then used to prepare the glenoid for the preplanned implant position. The peripheral guide was then placed and the peripheral peg holes were drilled into position. The center drill was replaced to make sure with the reaming that the depth was correct then the depth gauge was used. Next the appropriately sized glenoid trial was placed with good fit. The holes were then irrigated. Gelfoam and thrombin was then packed into the holes and the shoulder was then protected with a gauze sponge.  At this point the cement was mixed for the glenoid prosthesis and when it was at the appropriate time the Gelfoam was removed from the glenoid holes and cement was injected into the peripheral holes. It was then pressurized using the pressurizing tool. The tool was then removed, the peripheral holes were then refilled with cement, the glenoid prosthesis with bone graft was then put into the glenoid and held until the cement was firm. At this point it was felt that the glenoid was firmly fixed into the glenoid scapular bone. The GPS tracker was removed from the coracoid. The retractors were removed, the shoulder was externally rotated, and the trial heads that were of the 47 mm diameter were then placed onto the prosthesis. Once appropriate stability was felt to be obtained with the trials the torque screwdriver was used to finalize the position of the replicator plate. The corresponding prosthetic head was then selected and placed with appropriated version, inclination / coverage and hammered onto the [facility] TRISHA taper. After the prosthesis was checked the shoulder was reduced. It was placed through a ROM and felt to have a good relationship with the glenoid. The shoulder was again irrigated. The fiberwires were then used with grasping suture bites and placed into the subscapularis. After these had all been placed they were then tied down. It was felt the subscapuarlis was very secure at this point and several #2 non-absorbable sutures were also sutured to the anterior subscapularis medial and lateral tendon sites and tied as well. A #2 non-absorbable suture was placed laterally into the rotator interval. On the table 30 degrees of easy external rotation was able to be obtained after the subscap had been repaired. At this point the shoulder was again irrigated. The soft tissues were injected with a mixture of epi and Naropin along the posterior capsule and along the subcutaneous tissue. The rest of the vanc powder was placed anterior to the subscapularis. Counts were correct.  The subcutaneous tissue was closed with a 2.0 monocryl. The skin was closed with a subcuticular 3.0 Quill. Dermabond was applied and a long sterile dressing was placed on the shoulder. The patient was put in a sling and returned to the recovery room in a satisfactory condition. There were no known intraoperative complications. Post-operative plan: Will begin TSA post op protocol with PROM in external rotation to 10-15 degrees. Estimated Blood Loss:   125 ml    Fluids:    see anesthesia record    Implant:   Implant Name Type Inv.  Item Serial No.  Lot No. LRB No. Used Action   CEMENT BNE RP FL DOSE 40GM -- SIMPLEX P SNGL/EA ONLY - [id number]  CEMENT BNE RP FL DOSE 40GM -- SIMPLEX P SNGL/EA ONLY  [employer] [facility]_ [id number] Right 1 Implanted   EQUINOXE CAGE GLENOID POSTERIOR AUGMENT CEMENTED    [id number]  [id number] Right 1 Implanted   STEM HUM [id number] SHLDR TESS PRESSFIT EQUINOXE - [id number]  STEM HUM [id number] SHLDR TESS PRESSFIT EQUINOXE  [employer]_WD [id number] Right 1 Implanted   SCREW BNE AD PED L47MM [id number] CANC SHLDR NONLOCKING - [id number]  SCREW BNE AD PED L47MM [id number] CANC SHLDR NONLOCKING  [employer]_WD [id number] Right 1 Implanted   PLATE BNE 7.4QL SHARRON REPLICATOR O/S EQUINOXE - [id number]  PLATE BNE 8.5XA SHARRON REPLICATOR O/S [employer]_WD [id number] Right 1 Implanted   HEAD HUM [id number] SHT SHLDR FOR HEMIARTHROPLASTY EQUINOXE - [id number]  HEAD HUM [id number] SHT SHLDR FOR HEMIARTHROPLASTY [employer]_WD [id number] Right 1 Implanted       Closure: Primary    Complications: None    Signed By: Krishna Hu MD

## 2021-08-27 NOTE — PROGRESS NOTES
Spiritual Care visit. Initial Visit, Pre Surgery Consult. Visit and prayer before patient goes to surgery.     Visit by Krishna Brasher M.Ed., Th.B. ,Staff

## 2021-08-27 NOTE — PROGRESS NOTES
TRANSFER - IN REPORT:    Verbal report received from [facility]) on Carlo Espana  being received from pacu(unit) for routine post - op      Report consisted of patients Situation, Background, Assessment and   Recommendations(SBAR). Information from the following report(s) OR Summary was reviewed with the receiving nurse. Opportunity for questions and clarification was provided. Assessment completed upon patients arrival to unit and care assumed.

## 2021-08-27 NOTE — ANESTHESIA POSTPROCEDURE EVALUATION
Procedure(s):  RIGHT TOTAL SHOULDER ARTHROPLASTY & BICEP TENODESIS. general    Anesthesia Post Evaluation      Multimodal analgesia: multimodal analgesia used between 6 hours prior to anesthesia start to PACU discharge  Patient location during evaluation: PACU  Patient participation: complete - patient participated  Level of consciousness: sleepy but conscious  Pain management: adequate  Airway patency: patent  Anesthetic complications: no  Cardiovascular status: hemodynamically stable  Respiratory status: spontaneous ventilation  Hydration status: euvolemic  Comments: Patient stable and may discharge at this time. Post anesthesia nausea and vomiting:  none  Final Post Anesthesia Temperature Assessment:  Normothermia (36.0-37.5 degrees C)      INITIAL Post-op Vital signs:   Vitals Value Taken Time   /66 08/27/21 1735   Temp 36.6 °C (97.8 °F) 08/27/21 1705   Pulse 68 08/27/21 1736   Resp 16 08/27/21 1705   SpO2 96 % 08/27/21 1736   Vitals shown include unvalidated device data.

## 2021-08-27 NOTE — ANESTHESIA PREPROCEDURE EVALUATION
Relevant Problems   No relevant active problems       Anesthetic History   No history of anesthetic complications            Review of Systems / Medical History  Patient summary reviewed, nursing notes reviewed and pertinent labs reviewed    Pulmonary  Within defined limits                 Neuro/Psych   Within defined limits           Cardiovascular  Within defined limits                Exercise tolerance: >4 METS     GI/Hepatic/Renal     GERD: well controlled           Endo/Other        Arthritis     Other Findings              Physical Exam    Airway  Mallampati: I  TM Distance: > 6 cm  Neck ROM: normal range of motion   Mouth opening: Normal     Cardiovascular  Regular rate and rhythm,  S1 and S2 normal,  no murmur, click, rub, or gallop             Dental  No notable dental hx       Pulmonary  Breath sounds clear to auscultation               Abdominal         Other Findings            Anesthetic Plan    ASA: 2  Anesthesia type: general - interscalene block      Post-op pain plan if not by surgeon: peripheral nerve block single    Induction: Intravenous  Anesthetic plan and risks discussed with: Patient and Spouse

## 2021-08-27 NOTE — BRIEF OP NOTE
Brief Postoperative Note    Patient: Humera Alcantar  YOB: 1965  MRN: [id number]    Date of Procedure: 8/27/2021     Pre-Op Diagnosis: Osteoarthritis of right shoulder, unspecified osteoarthritis type [M19.011]  Strain of muscle(s) and tendon(s) of the rotator cuff of right shoulder, sequela [S46.011S]    Post-Op Diagnosis: Same as preoperative diagnosis. Procedure(s):  RIGHT TOTAL SHOULDER ARTHROPLASTY & BICEP TENODESIS    Surgeon(s):  Jesús Woodson MD    Surgical Assistant: Surg Asst-1: [employer]    Anesthesia: General     Estimated Blood Loss (mL): 314 ml    Complications: None    Specimens: * No specimens in log *     Implants:   Implant Name Type Inv.  Item Serial No.  Lot No. LRB No. Used Action   CEMENT BNE RP FL DOSE 40GM -- SIMPLEX P SNGL/EA ONLY - [id number]  CEMENT BNE RP FL DOSE 40GM -- SIMPLEX P SNGL/EA ONLY  [employer] [facility]_WD [id number] Right 1 Implanted   EQUINOXE CAGE GLENOID POSTERIOR AUGMENT CEMENTED    [id number]  [id number] Right 1 Implanted   STEM HUM [id number] SHLDR TESS PRESSFIT EQUINOXE - [id number]  STEM HUM [id number] SHLDR TESS PRESSFIT EQUINOXE  [employer]WD [id number] Right 1 Implanted   SCREW BNE AD PED L47MM [id number] CANC SHLDR NONLOCKING - [id number]  SCREW BNE AD PED L47MM [id number] CANC SHLDR NONLOCKING  [employer]WD [id number] Right 1 Implanted   PLATE BNE 0.9LS SHARRON REPLICATOR O/S EQUINOXE - [id number]  PLATE BNE 9.0TZ SHARRON REPLICATOR O/S EQUINOXE  [employer]_WD [id number] Right 1 Implanted   HEAD HUM [id number] SHT SHLDR FOR HEMIARTHROPLASTY [employer] - [id number]  HEAD HUM [id number] SHT SHLDR FOR HEMIARTHROPLASTY [employer]E  [employer]WD [id number] Right 1 Implanted       Drains: * No LDAs found *    Findings: See operative note    Electronically Signed by Jessi Mera MD on 8/27/2021 at 5:07 PM

## 2021-08-27 NOTE — PERIOP NOTES
TRANSFER - OUT REPORT:    Verbal report given to harrison(name) on Marcin Matthew  being transferred to [facility](unit) for routine post - op       Report consisted of patients Situation, Background, Assessment and   Recommendations(SBAR). Information from the following report(s) SBAR, Kardex, OR Summary, Procedure Summary, Intake/Output and MAR was reviewed with the receiving nurse. Lines:   Peripheral IV 08/27/21 Left;Posterior Hand (Active)   Site Assessment Clean, dry, & intact 08/27/21 1730   Phlebitis Assessment 0 08/27/21 1730   Infiltration Assessment 0 08/27/21 1730   Dressing Status Clean, dry, & intact 08/27/21 1730   Dressing Type Transparent 08/27/21 1730   Hub Color/Line Status Infusing;Green 08/27/21 1730        Opportunity for questions and clarification was provided.       Patient transported with:   Monitor  Tech

## 2021-08-28 VITALS
WEIGHT: 217 LBS | RESPIRATION RATE: 18 BRPM | HEART RATE: 53 BPM | OXYGEN SATURATION: 95 % | HEIGHT: 75 IN | TEMPERATURE: 97.7 F | DIASTOLIC BLOOD PRESSURE: 68 MMHG | SYSTOLIC BLOOD PRESSURE: 111 MMHG | BODY MASS INDEX: 26.98 KG/M2

## 2021-08-28 LAB
HCT VFR BLD AUTO: 39.3 % (ref 41.1–50.3)
HGB BLD-MCNC: 13.5 G/DL (ref 13.6–17.2)

## 2021-08-28 PROCEDURE — 97535 SELF CARE MNGMENT TRAINING: CPT

## 2021-08-28 PROCEDURE — 74011250636 HC RX REV CODE- 250/636: Performed by: ORTHOPAEDIC SURGERY

## 2021-08-28 PROCEDURE — 85014 HEMATOCRIT: CPT

## 2021-08-28 PROCEDURE — 36415 COLL VENOUS BLD VENIPUNCTURE: CPT

## 2021-08-28 PROCEDURE — 97530 THERAPEUTIC ACTIVITIES: CPT

## 2021-08-28 PROCEDURE — 74011000250 HC RX REV CODE- 250: Performed by: ORTHOPAEDIC SURGERY

## 2021-08-28 PROCEDURE — 97161 PT EVAL LOW COMPLEX 20 MIN: CPT

## 2021-08-28 PROCEDURE — 2709999900 HC NON-CHARGEABLE SUPPLY

## 2021-08-28 PROCEDURE — 74011250637 HC RX REV CODE- 250/637: Performed by: ORTHOPAEDIC SURGERY

## 2021-08-28 PROCEDURE — 97165 OT EVAL LOW COMPLEX 30 MIN: CPT

## 2021-08-28 RX ADMIN — Medication 10 ML: at 05:04

## 2021-08-28 RX ADMIN — OXYCODONE HYDROCHLORIDE AND ACETAMINOPHEN 2 TABLET: 7.5; 325 TABLET ORAL at 08:16

## 2021-08-28 RX ADMIN — OXYCODONE HYDROCHLORIDE AND ACETAMINOPHEN 2 TABLET: 7.5; 325 TABLET ORAL at 00:21

## 2021-08-28 RX ADMIN — ASPIRIN 325 MG: 325 TABLET, COATED ORAL at 08:16

## 2021-08-28 RX ADMIN — OXYCODONE HYDROCHLORIDE AND ACETAMINOPHEN 2 TABLET: 7.5; 325 TABLET ORAL at 12:20

## 2021-08-28 RX ADMIN — CEFAZOLIN SODIUM 2 G: 10 INJECTION, POWDER, FOR SOLUTION INTRAVENOUS at 00:13

## 2021-08-28 RX ADMIN — CEFAZOLIN SODIUM 2 G: 10 INJECTION, POWDER, FOR SOLUTION INTRAVENOUS at 08:17

## 2021-08-28 RX ADMIN — DOCUSATE SODIUM 50MG AND SENNOSIDES 8.6MG 1 TABLET: 8.6; 5 TABLET, FILM COATED ORAL at 08:16

## 2021-08-28 NOTE — PROGRESS NOTES
Patient resting quietly, alert and oriented, no distress noted. Right shoulder dressing c/d/i, with sling in place. .  Neurovascular and peripheral vascular checks WNL. Bed low and locked position. Call light within reach. Patient instructed to call for assistance, verbalizes understanding. Nursing assessment complete.

## 2021-08-28 NOTE — PROGRESS NOTES
Problem: Self Care Deficits Care Plan (Adult)  Goal: *Acute Goals and Plan of Care (Insert Text)  Outcome: Progressing Towards Goal  Note: GOALS:   DISCHARGE GOALS (in preparation for going home/rehab):  3 days  1. Mr. Koko Sawant will perform one upper and lower body dressing activity with minimal assistance with adaptive equipment to demonstrate improved functional mobility and safety. 2.  Mr. Koko Sawant will perform one upper and lower body bathing activity with minimal assistance with adaptive equipment to demonstrate improved functional mobility and safety. 3.  Mr. Koko Sawant will perform toileting/toilet transfer with contact guard assistance with adaptive equipment to demonstrate improved functional mobility and safety. 4.  Mr. Koko Sawant will perform shower transfer with contact guard assistance with adaptive equipment to demonstrate improved functional mobility and safety. 5.  Mr. Koko Sawant will state TSA precautions with two verbal cues to demonstrate improved functional mobility and safety. JOINT CAMP OCCUPATIONAL THERAPY TSA: Initial Assessment, Daily Note, and AM 8/28/2021  INPATIENT: Hospital Day: 2  Payor: [place] [id number] / Plan: [address] / Product Type: PPO /      NAME/AGE/GENDER: Doreen Padilla is a 48 y.o. male   PRIMARY DIAGNOSIS:  Osteoarthritis of right shoulder, unspecified osteoarthritis type [M19.011]  Strain of muscle(s) and tendon(s) of the rotator cuff of right shoulder, sequela [S46.011S]   Procedure(s) and Anesthesia Type:     * RIGHT TOTAL SHOULDER ARTHROPLASTY & BICEP TENODESIS - General (Right)  ICD-10: Treatment Diagnosis: R TSA   Pain in Right Shoulder (M25.511)  Stiffness of Right Shoulder, Not elsewhere classified (M25.611)      ASSESSMENT:     Mr. Koko Sawant is s/p R TSA and presents with decreased weight bearing on R UE and decreased independence with activities of daily living as compared to prior level of function and safety.   Patient would benefit from skilled Occupational Therapy to maximize independence and safety with self-care tasks. Patient plans for further rehab at home with home health services and good family support from spouse. OT reviewed therapy schedule and plan of care with patient. Patient was able to transfer and perform self care skills as charted below. Pt declined shower today, but was educated on adaptive techniques for dressing and bathing. Pt reports that his spouse will continue to assist him with what he cannot do himself. Pt educated on movements to avoid and don/doffing sling. This section established at most recent assessment   PROBLEM LIST (Impairments causing functional limitations):  Decreased Strength  Decreased ADL/Functional Activities  Decreased Transfer Abilities  Increased Pain  Increased Fatigue  Decreased Flexibility/Joint Mobility  Decreased Knowledge of Precautions   INTERVENTIONS PLANNED: (Benefits and precautions of occupational therapy have been discussed with the patient.)  Activities of daily living training  Adaptive equipment training  Balance training  Clothing management  Donning&doffing training  Theraputic activity     TREATMENT PLAN: Frequency/Duration: Follow patient 1-2 sessions to address above goals. Rehabilitation Potential For Stated Goals: Good     RECOMMENDED REHABILITATION/EQUIPMENT: (at time of discharge pending progress): Continue Skilled Therapy. OCCUPATIONAL PROFILE AND HISTORY:   History of Present Injury/Illness (Reason for Referral): Pt presents this date s/p (R) TSA. Past Medical History/Comorbidities:   Mr. Micheline Cogan  has a past medical history of Arthritis and GERD (gastroesophageal reflux disease). Mr. Micheline Cogan  has a past surgical history that includes hx orthopaedic (late 1990's) and hx rotator cuff repair (Right, 2020).   Social History/Living Environment:   Home Environment: Private residence  One/Two Story Residence: One story  Living Alone: No  Support Systems: Spouse/Significant Other/Partner  Patient Expects to be Discharged to[de-identified] House    Prior Level of Function/Work/Activity:  Assistance needed for the past couple of years with bathing and dressing due to R shoulder, lives with spouse     Number of Personal Factors/Comorbidities that affect the Plan of Care: Brief history (0):  LOW COMPLEXITY   ASSESSMENT OF OCCUPATIONAL PERFORMANCE[de-identified]   Most Recent Physical Functioning:   Balance  Sitting: Intact  Standing: Intact       Gross Assessment  AROM: Within functional limits (B LEs)  Strength: Within functional limits (B LEs)  Coordination: Within functional limits            Coordination  Fine Motor Skills-Upper: Left Intact; Right Intact  Gross Motor Skills-Upper: Right Impaired;Left Intact         Mental Status  Neurologic State: Alert  Orientation Level: Oriented X4  Cognition: Appropriate decision making  Perception: Appears intact  Perseveration: No perseveration noted  Safety/Judgement: Awareness of environment    RUE PROM  R Shoulder Flexion: 10  R Shoulder External Rotation: 5  RUE Strength  R : 3-           Basic ADLs (From Assessment) Complex ADLs (From Assessment)   Basic ADL  Feeding: Independent  Oral Facial Hygiene/Grooming: Setup  Bathing: Minimum assistance  Upper Body Dressing: Minimum assistance  Lower Body Dressing: Minimum assistance  Toileting: Contact guard assistance     Grooming/Bathing/Dressing Activities of Daily Living     Cognitive Retraining  Safety/Judgement: Awareness of environment                 Functional Transfers  Bathroom Mobility: Supervision/set up  Toilet Transfer : Supervision  Shower Transfer: Supervision     Bed/Mat Mobility  Supine to Sit: Supervision  Sit to Supine: Supervision  Sit to Stand: Stand-by assistance  Stand to Sit: Stand-by assistance  Bed to Chair: Supervision         Physical Skills Involved:  Range of Motion  Balance  Strength Cognitive Skills Affected (resulting in the inability to perform in a timely and safe manner):  [facility] Psychosocial Skills Affected:  Habits/Routines  Environmental Adaptation   Number of elements that affect the Plan of Care: 3-5:  MODERATE COMPLEXITY   CLINICAL DECISION MAKIN[address] [place] [address] AM-PAC 6 Clicks   Daily Activity Inpatient Short Form  How much help from another person does the patient currently need. .. Total A Lot A Little None   1. Putting on and taking off regular lower body clothing? [] 1   [] 2   [x] 3   [] 4   2. Bathing (including washing, rinsing, drying)? [] 1   [] 2   [x] 3   [] 4   3. Toileting, which includes using toilet, bedpan or urinal?   [] 1   [] 2   [] 3   [x] 4   4. Putting on and taking off regular upper body clothing? [] 1   [] 2   [x] 3   [] 4   5. Taking care of personal grooming such as brushing teeth? [] 1   [] 2   [] 3   [x] 4   6. Eating meals? [] 1   [] 2   [] 3   [x] 4   © , Trustees of [address] [place] [address], under license to [employer]. All rights reserved     Score:  Initial: 21 Most Recent: X (Date: -- )    Interpretation of Tool:  Represents activities that are increasingly more difficult (i.e. Bed mobility, Transfers, Gait). Medical Necessity:     Skilled intervention continues to be required due to the above deficits. Reason for Services/Other Comments:  Patient continues to require skilled intervention due to   New TSA  . Use of outcome tool(s) and clinical judgement create a POC that gives a: LOW COMPLEXITY            TREATMENT:   (In addition to Assessment/Re-Assessment sessions the following treatments were rendered)     Pre-treatment Symptoms/Complaints:  As soon as OT walked in, pt with complaint of severe headache  Pain: Initial:   Pain Location 1: Head  Post Session:       Self Care: (24): Procedure(s) (per grid) utilized to improve and/or restore self-care/home management as related to dressing and functional mobility .  Required minimal verbal, manual, and tactile cueing to facilitate activities of daily living skills, compensatory activities, and education provided for other ADL tasks not performed during session . Spouse plans to assist with whatever he needs. Evaluation complete    Treatment/Session Assessment:     Response to Treatment:  fair, supine in bed due to headache. Education:  [] Home Exercises  [x] Fall Precautions  [x] Shoulder Precautions [] Going Home Video  [] Knee/Hip Prosthesis Review  [] Walker Management/Safety [x] Adaptive Equipment as Needed       Interdisciplinary Collaboration:   Physical Therapist  Occupational Therapist  Registered Nurse    After treatment position/precautions:   Supine in bed  Bed/Chair-wheels locked  Bed in low position  Caregiver at bedside  Call light within reach  RN notified     Compliance with Program/Exercises: Will assess as treatment progresses. Recommendations/Intent for next treatment session:  Treatment next visit will focus on increasing Mr. Bennetts independence with bed mobility, transfers, self care, functional mobility, modalities for pain, and patient education.       Total Treatment Duration:  OT Patient Time In/Time Out  Time In: 0930  Time Out: Rodrick Jones5, OT

## 2021-08-28 NOTE — PROGRESS NOTES
Agreeable to stay for 2nd PT session.  Spoke with Dr. Mounika Post who said that he does not want patient rushed for discharge due to the extensive surgery    Spouse is eager for him to go home

## 2021-08-28 NOTE — PROGRESS NOTES
Problem: Mobility Impaired (Adult and Pediatric)  Goal: *Acute Goals and Plan of Care (Insert Text)  Outcome: Progressing Towards Goal  Note: GOALS (1-4 days):  (1.)  Patient will move from supine to sit and sit to supine  in bed with INDEPENDENT. (2.)  Patient will transfer from bed to chair and chair to bed with INDEPENDENT using the least restrictive device. (3.)  Patient will ambulate with INDEPENDENT for 300 feet with the least restrictive device. (4.)  Patient will be independent with shoulder HEP to increase range of motion per MD orders. ________________________________________________________________________________________________       PHYSICAL THERAPY: Initial Assessment and AM 8/28/2021  INPATIENT: Hospital Day: 2  Payor: [address] / Plan: [address] / Product Type: PPO /      NAME/AGE/GENDER: Saeid Gregory is a 48 y.o. male   PRIMARY DIAGNOSIS: Osteoarthritis of right shoulder, unspecified osteoarthritis type [M19.011]  Strain of muscle(s) and tendon(s) of the rotator cuff of right shoulder, sequela [S46.011S]  Shoulder arthritis [M19.019] Arthritis of right shoulder region Arthritis of right shoulder region  Procedure(s) (LRB):  RIGHT TOTAL SHOULDER ARTHROPLASTY & BICEP TENODESIS (Right)  1 Day Post-Op  ICD-10: Treatment Diagnosis:   · Pain in Right Shoulder (M25.511)  · Stiffness of Right Shoulder, Not elsewhere classified (M25.611)     Precaution/Allergies:  Patient has no known allergies. TSA post op protocol with PROM in external rotation to 10-15 degrees     ASSESSMENT:     Mr. Olman Elizabeth presents s/p R TSA with biceps tenodesis. Patient demonstrates decreased R UE strength and ROM and decreased independence with HEP. He would benefit from therapy to address these deficits prior to returning home with his spouse. Patient with decreased tolerance for exercises this am.  Complained of pain with all exercises, even gripping.   Little tolerance for passive shoulder flexion and ER. Copy of HEP provided and plan to review again this afternoon. Patient is in [address] [place] [address] but RN spoke with MD and patient can use for comfort but can remove abduction pillow. This section established at most recent assessment   PROBLEM LIST (Impairments causing functional limitations):  1. Decreased Devon with Bed Mobility  2. Decreased [place] with Transfers  3. Decreased [place] with Ambulation   4. Decreased Devon with shoulder HEP   INTERVENTIONS PLANNED: (Benefits and precautions of physical therapy have been discussed with the patient.)  1. Bed Mobility Training  2. Transfer Training  3. Gait Training  4. Therapeutic Exercises per MD orders  5. Modalities for Pain     TREATMENT PLAN: Frequency/Duration: twice daily for duration of hospital stay  Rehabilitation Potential For Stated Goals: Good     RECOMMENDED REHABILITATION/EQUIPMENT: (at time of discharge pending progress): Continue Skilled Therapy. HISTORY:   History of Present Injury/Illness (Reason for Referral):  R TSA with biceps tenodesis. Past Medical History/Comorbidities:   Mr. Devang Garsia  has a past medical history of Arthritis and GERD (gastroesophageal reflux disease). Mr. Devang Garsia  has a past surgical history that includes hx orthopaedic (late 1990's) and hx rotator cuff repair (Right, 2020). Social History/Living Environment:   Home Environment: Private residence  One/Two Story Residence: One story  Living Alone: No  Support Systems: Spouse/Significant Other/Partner  Patient Expects to be Discharged to[de-identified] House  Prior Level of Function/Work/Activity:  Independent; RHD   Number of Personal Factors/Comorbidities that affect the Plan of Care: 0: LOW COMPLEXITY   EXAMINATION:   Most Recent Physical Functioning:   Gross Assessment:  AROM: Within functional limits (B LEs)  Strength:  Within functional limits (B LEs)  Coordination: Within functional limits   RUE PROM  R Shoulder Flexion: 10  R Shoulder External Rotation: 5        RUE Strength  R : 3-  Posture:     Balance:  Sitting: Intact  Standing: Intact Bed Mobility:  Supine to Sit: Supervision  Sit to Supine: Supervision  Wheelchair Mobility:     Transfers:  Sit to Stand: Stand-by assistance  Stand to Sit: Stand-by assistance  Gait:     Distance (ft):  (within room)  Ambulation - Level of Assistance: Supervision      Body Structures Involved:  1. Joints  2. Muscles Body Functions Affected:  1. Movement Related Activities and Participation Affected:  1. Mobility   Number of elements that affect the Plan of Care: 4+: HIGH COMPLEXITY   CLINICAL PRESENTATION:   Presentation: Stable and uncomplicated: LOW COMPLEXITY   CLINICAL DECISION MAKIN[address] [place] [id number] AM-PAC 6 Clicks   Basic Mobility Inpatient Short Form  How much difficulty does the patient currently have. .. Unable A Lot A Little None   1. Turning over in bed (including adjusting bedclothes, sheets and blankets)? [] 1   [] 2   [x] 3   [] 4   2. Sitting down on and standing up from a chair with arms ( e.g., wheelchair, bedside commode, etc.)   [] 1   [] 2   [x] 3   [] 4   3. Moving from lying on back to sitting on the side of the bed? [] 1   [] 2   [x] 3   [] 4   How much help from another person does the patient currently need. .. Total A Lot A Little None   4. Moving to and from a bed to a chair (including a wheelchair)? [] 1   [] 2   [] 3   [x] 4   5. Need to walk in hospital room? [] 1   [] 2   [] 3   [x] 4   6. Climbing 3-5 steps with a railing? [] 1   [] 2   [] 3   [x] 4   © , Trustees of [address] [place] [address], under license to [employer]. All rights reserved      Score:  Initial: 21 Most Recent: X (Date: -- )    Interpretation of Tool:  Represents activities that are increasingly more difficult (i.e. Bed mobility, Transfers, Gait).     Medical Necessity:     · Patient is expected to demonstrate progress in   · strength and range of motion  ·  to · increase independence with HEP and R UE function. · .  Reason for Services/Other Comments:  · Patient continues to require skilled intervention due to   · Decreased R UE strength and ROM. · .   Use of outcome tool(s) and clinical judgement create a POC that gives a: Clear prediction of patient's progress: LOW COMPLEXITY            TREATMENT:   (In addition to Assessment/Re-Assessment sessions the following treatments were rendered)   Pre-treatment Symptoms/Complaints:  Patient agreeable. Pain: Initial:   Pain Intensity 1: 8  Post Session:  8/10-RN present with pain meds     Therapeutic Activity: (    30 minutes): Therapeutic activities including Bed transfers, Ambulation on level ground, exercises as below, and education on movement restrictions to improve mobility and strength. Required minimal verbal, visual, and manual cues   to promote coordination of right, upper extremity(s). Date:  8/28/21 Date:   Date:     ACTIVITY/EXERCISE AM PM AM PM AM PM   Gripping 15        Wrist Flexion/Extension 15        Wrist Ulnar/Radial Deviation         Pronation/Supination 15 AA        Elbow Flexion/Extension 15 AA        Shoulder Flexion/Extension 15 P        Shoulder AB/ADduction         Shoulder IR/ER 15 P        Pulleys         Pendulums 15 CW, 15 CCW        Shrugs         Isometric:                 Flexion         Extension         ABduction         ADduction         Biceps/Triceps                  B = bilateral; AA = active assistive; A = active; P = passive  Education:  [x]  Home Exercises  [x]  Sling Application   [x]  Movement Precautions   []  Pulleys   []  Use of Ice   []  Other:   Treatment/Session Assessment:    · Response to Treatment:  Patient tolerated fairly. Pleasant but complaint of pain with all movement.   · Interdisciplinary Collaboration:   o Physical Therapist  o Registered Nurse  o NP  · After treatment position/precautions:   o Supine in bed  o Bed/Chair-wheels locked  o Call light within reach  o RN notified   · Compliance with Program/Exercises: Will assess as treatment progresses. · Recommendations/Intent for next treatment session:  Treatment next visit will focus on increasing Mr. Anton's independence with bed mobility, transfers, gait training, strength/ROM exercises, modalities for pain, and patient education.    Total Treatment Duration:  PT Patient Time In/Time Out  Time In: 0750  Time Out: Isabel Rai, [place]

## 2021-08-28 NOTE — PROGRESS NOTES
Care Management Interventions  PCP Verified by CM: Yes  Current Support Network: Lives with Spouse  Discharge Location  Discharge Placement: Home with family assistance  48 MM with RTSA with Dr. Petr Eduardo. Pt will follow-up with Dr. Petr Eduardo for any needed outpatient therapy. No HH needed.

## 2021-08-28 NOTE — PROGRESS NOTES
Contacted Dr. Lin Coffey regarding abduction pillow.  He says patient can wear it if he feels more comfortable however does not need it

## 2021-08-28 NOTE — PROGRESS NOTES
Orthopedic Joint Progress Note    2021  Admit Date: 2021  Admit Diagnosis: Osteoarthritis of right shoulder, unspecified osteoarthritis type [M19.011]  Strain of muscle(s) and tendon(s) of the rotator cuff of right shoulder, sequela [S46.011S]  Shoulder arthritis [M19.019]    1 Day Post-Op    Subjective:     Art Benja PATIENT IN BED; THERAPY AT BEDSIDE; CO EXPECTED RIGHT SHOULDER PAIN    Review of Systems: Pertinent items are noted in HPI. Objective:     PT/OT:     PATIENT MOBILITY                           Vital Signs:    Blood pressure (!) 138/93, pulse 79, temperature 98 °F (36.7 °C), resp. rate 18, height 6' 3\" (1.905 m), weight 217 lb (98.4 kg), SpO2 94 %.   Temp (24hrs), Av °F (36.7 °C), Min:97.8 °F (36.6 °C), Max:98.1 °F (36.7 °C)      Pain Control:   Pain Assessment  Pain Scale 1: Numeric (0 - 10)  Pain Intensity 1: 8  Pain Location 1: Shoulder  Pain Orientation 1: Right  Pain Description 1: Aching  Pain Intervention(s) 1: Medication (see MAR)    Meds:  Current Facility-Administered Medications   Medication Dose Route Frequency    promethazine (PHENERGAN) tablet 25 mg  25 mg Oral Q6H PRN    senna-docusate (PERICOLACE) 8.6-50 mg per tablet 1 Tablet  1 Tablet Oral DAILY    0.9% sodium chloride infusion  100 mL/hr IntraVENous CONTINUOUS    sodium chloride (NS) flush 5-40 mL  5-40 mL IntraVENous Q8H    sodium chloride (NS) flush 5-40 mL  5-40 mL IntraVENous PRN    oxyCODONE-acetaminophen (PERCOCET 7.5) 7.5-325 mg per tablet 1-2 Tablet  1-2 Tablet Oral Q4H PRN    HYDROmorphone (DILAUDID) injection 0.5-1 mg  0.5-1 mg IntraVENous Q3H PRN    naloxone (NARCAN) injection 0.4 mg  0.4 mg IntraVENous Q10MIN PRN    aspirin delayed-release tablet 325 mg  325 mg Oral DAILY    ondansetron (ZOFRAN) injection 4 mg  4 mg IntraVENous Q4H PRN    diphenhydrAMINE (BENADRYL) capsule 25 mg  25 mg Oral Q4H PRN    senna-docusate (PERICOLACE) 8.6-50 mg per tablet 1 Tablet  1 Tablet Oral BID PRN LAB:    No results found for: INR, INREXT, INREXT  Lab Results   Component Value Date/Time    HGB 13.5 (L) 08/28/2021 05:35 AM    HGB 13.9 08/27/2021 07:38 PM    HGB 14.4 08/20/2021 09:51 AM       Wound Shoulder Right (Active)   Number of days: 435       Incision 08/27/21 Shoulder Right (Active)   Dressing Status Clean;Dry; Intact 08/27/21 1937   Dressing/Treatment Other (Comment) 08/27/21 1937   Number of days: 1         Physical Exam:  General: alert, cooperative, no distress, appears stated age  Cardiovascular negative  Lungs: negative  Musculoskeletal: ABLE TO MOVE RIGHT ELBOPW, WRIST, FINGERS  Neurological: N/V INTACT  Skin:DRESSING CLEAN/DRY/INTACT    Assessment:      Principal Problem:    Arthritis of right shoulder region (6/10/2021)    Active Problems:    Tendinopathy of right rotator cuff (8/27/2021)      Shoulder arthritis (8/27/2021)         Plan:     Continue PT/OT/Rehab  Consult: Rehab team including PT, OT, recreational therapy, and    [address]

## 2021-08-28 NOTE — PROGRESS NOTES
Problem: Mobility Impaired (Adult and Pediatric)  Goal: *Acute Goals and Plan of Care (Insert Text)  Outcome: Progressing Towards Goal  Note: GOALS (1-4 days):  (1.)  Patient will move from supine to sit and sit to supine  in bed with INDEPENDENT. (2.)  Patient will transfer from bed to chair and chair to bed with INDEPENDENT using the least restrictive device. (3.)  Patient will ambulate with INDEPENDENT for 300 feet with the least restrictive device. (4.)  Patient will be independent with shoulder HEP to increase range of motion per MD orders. ________________________________________________________________________________________________       PHYSICAL THERAPY: Daily Note and PM 8/28/2021  INPATIENT: Hospital Day: 2  Payor: Cassidy [employer] / Plan: [address] / Product Type: PPO /      NAME/AGE/GENDER: Doreen Padilla is a 48 y.o. male   PRIMARY DIAGNOSIS: Osteoarthritis of right shoulder, unspecified osteoarthritis type [M19.011]  Strain of muscle(s) and tendon(s) of the rotator cuff of right shoulder, sequela [S46.011S]  Shoulder arthritis [M19.019] Arthritis of right shoulder region Arthritis of right shoulder region  Procedure(s) (LRB):  RIGHT TOTAL SHOULDER ARTHROPLASTY & BICEP TENODESIS (Right)  1 Day Post-Op  ICD-10: Treatment Diagnosis:   · Pain in Right Shoulder (M25.511)  · Stiffness of Right Shoulder, Not elsewhere classified (M25.611)     Precaution/Allergies:  Patient has no known allergies. TSA post op protocol with PROM in external rotation to 10-15 degrees     ASSESSMENT:     Mr. Koko Sawant presents s/p R TSA with biceps tenodesis. Patient demonstrates decreased R UE strength and ROM and decreased independence with HEP. He would benefit from therapy to address these deficits prior to returning home with his spouse. Patient with decreased tolerance for exercises this am.  Complained of pain with all exercises, even gripping.   Little tolerance for passive shoulder flexion and ER. Copy of HEP provided and plan to review again this afternoon. Patient is in [address] [place] [address] but RN spoke with MD and patient can continue to use for comfort but can also remove abduction pillow if chooses. PM:  Patient with improved tolerance this afternoon and anxious to d/c home. Spouse present for session. Demonstrated all exercises and discussed use of sling-spouse reports they previously used the abduction pillow for about 2 weeks and then removed it. No questions or concerns prior to d/c home. This section established at most recent assessment   PROBLEM LIST (Impairments causing functional limitations):  1. Decreased Roosevelt with Bed Mobility  2. Decreased Roosevelt with Transfers  3. Decreased Roosevelt with Ambulation   4. Decreased Roosevelt with shoulder HEP   INTERVENTIONS PLANNED: (Benefits and precautions of physical therapy have been discussed with the patient.)  1. Bed Mobility Training  2. Transfer Training  3. Gait Training  4. Therapeutic Exercises per MD orders  5. Modalities for Pain     TREATMENT PLAN: Frequency/Duration: twice daily for duration of hospital stay  Rehabilitation Potential For Stated Goals: Good     RECOMMENDED REHABILITATION/EQUIPMENT: (at time of discharge pending progress): Continue Skilled Therapy. HISTORY:   History of Present Injury/Illness (Reason for Referral):  R TSA with biceps tenodesis. Past Medical History/Comorbidities:   Mr. Gianna Chamberlain  has a past medical history of Arthritis and GERD (gastroesophageal reflux disease). Mr. Gianna Chamberlain  has a past surgical history that includes hx orthopaedic (late 1990's) and hx rotator cuff repair (Right, 2020).   Social History/Living Environment:   Home Environment: Private residence  One/Two Story Residence: One story  Living Alone: No  Support Systems: Spouse/Significant Other/Partner  Patient Expects to be Discharged to[de-identified] Penelope  Prior Level of Function/Work/Activity:  Independent; RHD   Number of Personal Factors/Comorbidities that affect the Plan of Care: 0: LOW COMPLEXITY   EXAMINATION:   Most Recent Physical Functioning:   Gross Assessment:  AROM: Within functional limits (B LEs)  Strength: Within functional limits (B LEs)  Coordination: Within functional limits   RUE PROM  R Shoulder Flexion: 10  R Shoulder External Rotation: 5        RUE Strength  R : 3-  Posture:     Balance:  Sitting: Intact  Standing: Intact Bed Mobility:  Supine to Sit: Modified independent  Sit to Supine: Modified independent  Wheelchair Mobility:     Transfers:  Sit to Stand: Independent  Stand to Sit: Independent  Gait:     Distance (ft):  (within room)  Ambulation - Level of Assistance: Supervision      Body Structures Involved:  1. Joints  2. Muscles Body Functions Affected:  1. Movement Related Activities and Participation Affected:  1. Mobility   Number of elements that affect the Plan of Care: 4+: HIGH COMPLEXITY   CLINICAL PRESENTATION:   Presentation: Stable and uncomplicated: LOW COMPLEXITY   CLINICAL DECISION MAKING:   [facility] AM-PAC 6 Clicks   Basic Mobility Inpatient Short Form  How much difficulty does the patient currently have. .. Unable A Lot A Little None   1. Turning over in bed (including adjusting bedclothes, sheets and blankets)? [] 1   [] 2   [x] 3   [] 4   2. Sitting down on and standing up from a chair with arms ( e.g., wheelchair, bedside commode, etc.)   [] 1   [] 2   [x] 3   [] 4   3. Moving from lying on back to sitting on the side of the bed? [] 1   [] 2   [x] 3   [] 4   How much help from another person does the patient currently need. .. Total A Lot A Little None   4. Moving to and from a bed to a chair (including a wheelchair)? [] 1   [] 2   [] 3   [x] 4   5. Need to walk in hospital room? [] 1   [] 2   [] 3   [x] 4   6. Climbing 3-5 steps with a railing? [] 1   [] 2   [] 3   [x] 4   © 2007, Trustees of [facility], under license to [employer]. All rights reserved      Score:  Initial: 21 Most Recent: X (Date: -- )    Interpretation of Tool:  Represents activities that are increasingly more difficult (i.e. Bed mobility, Transfers, Gait). Medical Necessity:     · Patient is expected to demonstrate progress in   · strength and range of motion  ·  to   · increase independence with HEP and R UE function. · .  Reason for Services/Other Comments:  · Patient continues to require skilled intervention due to   · Decreased R UE strength and ROM. · .   Use of outcome tool(s) and clinical judgement create a POC that gives a: Clear prediction of patient's progress: LOW COMPLEXITY            TREATMENT:   (In addition to Assessment/Re-Assessment sessions the following treatments were rendered)   Pre-treatment Symptoms/Complaints:  Patient agreeable. Pain: Initial:   Pain Intensity 1: 5  Post Session:  5/10     Therapeutic Activity: (    25 minutes): Therapeutic activities including Bed transfers, exercises as below, and education on movement restrictions and use of sling to improve mobility and strength. Required minimal verbal, visual, and manual cues   to promote coordination of right, upper extremity(s).       Date:  8/28/21 Date:   Date:     ACTIVITY/EXERCISE AM PM AM PM AM PM   Gripping 15 15       Wrist Flexion/Extension 15 15       Wrist Ulnar/Radial Deviation         Pronation/Supination 15 AA 15 AA       Elbow Flexion/Extension 15 AA 15 AA       Shoulder Flexion/Extension 15 P 15 P       Shoulder AB/ADduction         Shoulder IR/ER 15 P 15 P       Pulleys         Pendulums 15 CW, 15 CCW 15 CW, 15 CCW       Shrugs         Isometric:                 Flexion         Extension         ABduction         ADduction         Biceps/Triceps                  B = bilateral; AA = active assistive; A = active; P = passive  Education:  [x]  Home Exercises  [x]  Sling Application   [x]  Movement Precautions   []  Pulleys   []  Use of Ice   []  Other:   Treatment/Session Assessment:    · Response to Treatment:  Patient tolerated better this afternoon. · Interdisciplinary Collaboration:   o Physical Therapist  o Registered Nurse  · After treatment position/precautions:   o Supine in bed  o Bed/Chair-wheels locked  o Caregiver at bedside  o Call light within reach  o RN notified   · Compliance with Program/Exercises: Will assess as treatment progresses. · Recommendations/Intent for next treatment session:  Treatment next visit will focus on increasing Mr. Bennetts independence with bed mobility, transfers, gait training, strength/ROM exercises, modalities for pain, and patient education.    Total Treatment Duration:  PT Patient Time In/Time Out  Time In: 1320  Time Out: [facility]

## 2021-08-30 NOTE — DISCHARGE SUMMARY
DC Summary:    Patient ID:  Anival Ac  [id number]   88 y.o.  1965    Admit date: 8/27/2021    Discharge Date: 8/28/2021    Admitting Physician: Rekha Covington MD     Discharge Physician: Rekha Covington MD    Admission Diagnoses: Osteoarthritis of right shoulder, unspecified osteoarthritis type [M19.011]; Strain of muscle(s) and tendon(s) of the rotator cuff of right shoulder, sequela [S46.011S]; Shoulder arthritis [M19.019]    Last Procedure: Procedure(s):  RIGHT TOTAL SHOULDER ARTHROPLASTY & BICEP TENODESIS    Discharge Diagnoses: Principal Problem:    Arthritis of right shoulder region (6/10/2021)    Active Problems:    Tendinopathy of right rotator cuff (8/27/2021)      Shoulder arthritis (8/27/2021)         Consults: None    Significant Diagnostic Studies: labs: hgb     Hospital Course:   Normal hospital course for this procedure. Disposition: Home    Patient Instructions:   Cannot display discharge medications since this patient is not currently admitted. Diet: Reference my discharge instructions. Activity: Reference my discharge instructions. No orders of the defined types were placed in this encounter.        SignedGusophie Nick MD  August 30, 2021  5:20 PM

## 2021-09-07 PROBLEM — R13.10 DYSPHAGIA: Status: ACTIVE | Noted: 2021-07-27

## 2021-09-07 PROBLEM — Z12.11 ENCOUNTER FOR COLORECTAL CANCER SCREENING: Status: ACTIVE | Noted: 2021-07-27

## 2021-09-07 PROBLEM — Z12.12 ENCOUNTER FOR COLORECTAL CANCER SCREENING: Status: ACTIVE | Noted: 2021-07-27

## 2022-01-11 PROBLEM — Z96.611 STATUS POST TOTAL SHOULDER ARTHROPLASTY, RIGHT: Status: ACTIVE | Noted: 2022-01-11

## 2022-03-18 PROBLEM — S46.011S: Status: ACTIVE | Noted: 2021-06-10

## 2022-03-19 PROBLEM — R73.02 IGT (IMPAIRED GLUCOSE TOLERANCE): Status: ACTIVE | Noted: 2018-06-16

## 2022-03-19 PROBLEM — M19.011 ARTHRITIS OF RIGHT SHOULDER REGION: Status: ACTIVE | Noted: 2021-06-10

## 2022-03-19 PROBLEM — M12.811 RIGHT ROTATOR CUFF TEAR ARTHROPATHY: Status: ACTIVE | Noted: 2021-06-10

## 2022-03-19 PROBLEM — Z12.11 ENCOUNTER FOR COLORECTAL CANCER SCREENING: Status: ACTIVE | Noted: 2021-07-27

## 2022-03-19 PROBLEM — M75.101 RIGHT ROTATOR CUFF TEAR ARTHROPATHY: Status: ACTIVE | Noted: 2021-06-10

## 2022-03-19 PROBLEM — N41.1 CHRONIC PROSTATITIS: Status: ACTIVE | Noted: 2018-06-16

## 2022-03-19 PROBLEM — R97.20 ELEVATED PSA, LESS THAN 10 NG/ML: Status: ACTIVE | Noted: 2018-06-16

## 2022-03-19 PROBLEM — Z12.12 ENCOUNTER FOR COLORECTAL CANCER SCREENING: Status: ACTIVE | Noted: 2021-07-27

## 2022-03-19 PROBLEM — E66.3 OVERWEIGHT (BMI 25.0-29.9): Status: ACTIVE | Noted: 2018-06-16

## 2022-03-19 PROBLEM — R13.10 DYSPHAGIA: Status: ACTIVE | Noted: 2021-07-27

## 2022-03-19 PROBLEM — S43.431A SUPERIOR GLENOID LABRUM LESION OF RIGHT SHOULDER: Status: ACTIVE | Noted: 2021-06-10

## 2022-03-19 PROBLEM — E78.00 HYPERCHOLESTEROLEMIA: Status: ACTIVE | Noted: 2018-06-16

## 2022-03-19 PROBLEM — M67.911 TENDINOPATHY OF RIGHT ROTATOR CUFF: Status: ACTIVE | Noted: 2021-08-27

## 2022-03-19 PROBLEM — M19.019 SHOULDER ARTHRITIS: Status: ACTIVE | Noted: 2021-08-27

## 2022-03-20 PROBLEM — Z96.611 STATUS POST TOTAL SHOULDER ARTHROPLASTY, RIGHT: Status: ACTIVE | Noted: 2022-01-11

## 2022-08-10 NOTE — PROGRESS NOTES
Name: Rolo Yuen  YOB: 1965  Gender: male  MRN: [id number]    CC:   Chief Complaint   Patient presents with    Shoulder Pain     Recheck left shoulder     , The patient follows up 1 year(s) status post right TSA. Right Total Shoulder Arthroplasty & Bicep Tenodesis - Right   8/27/2021  W/C  HPI: The patient is almost 1 year out from right total shoulder arthroplasty. Mild pain at times. His left arm hurts more than his right. Occasionally has scapular pain. Was a previous  by [employer]. Doing okay. States he woke up yesterday with a little bit of medial elbow pain. Had been lifting his 25 pound grandchild. No Known Allergies  Past Medical History:   Diagnosis Date    Arthritis     GERD (gastroesophageal reflux disease)     History of      Past Surgical History:   Procedure Laterality Date    ORTHOPEDIC SURGERY  late 1990's    \"torn ligament\" \"reconstruction\" both knees    ROTATOR CUFF REPAIR Right 2020     Family History   Problem Relation Age of Onset    Heart Disease Mother      Social History     Socioeconomic History    Marital status:      Spouse name: Not on file    Number of children: Not on file    Years of education: Not on file    Highest education level: Not on file   Occupational History    Not on file   Tobacco Use    Smoking status: Never    Smokeless tobacco: Never   Substance and Sexual Activity    Alcohol use: Yes     Alcohol/week: 16.0 standard drinks    Drug use: Yes     Types: Marijuana [place])    Sexual activity: Not on file   Other Topics Concern    Not on file   Social History Narrative    Not on file     Social Determinants of Health     Financial Resource Strain: Not on file   Food Insecurity: Not on file   Transportation Needs: Not on file   Physical Activity: Not on file   Stress: Not on file   Social Connections: Not on file   Intimate Partner Violence: Not on file   Housing Stability: Not on file        No flowsheet data found.     Review of Systems  Noncontributory     PE right shoulder:  General: Alert and Oriented x3 and appears to be of stated age. Head and Face: Normocephalic, atraumatic. Neck: Supple, normal range of motion. Lungs: Normal Respiratory rate. No dyspnea. Skin: No rash or erythema. Skin is cool to the touch. Surgical incisions appear to be healing well and there is no sign of infection. Psychiatric: Normal mood and affect. Answers questions appropriately. Musculoskeletal: The patient's Range of Motion today was measured at: Forward Elevation of 165. Abduction of 130. External Rotation of 50   The patient's strength today is:  External Rotation: 5. Abduction 5 -/5. Belly Press Test: 5  The swelling is minimal. They are neurovascularly intact. A little tenderness over the medial epicondyle full elbow motion supination pronation. No tingling. Negative Tinel's    X-ray: Grashey and axillary lateral views of the operative right shoulder were obtained and reviewed today in the office. There has been no change in the hardware's alignment and the glenohumeral position is appropriate on all the films. AP     ICD-10-CM    1. Status post total shoulder arthroplasty, right  Z96.611 XR SHOULDER RIGHT (MIN 2 VIEWS)      2. Osteoarthritis of right shoulder, unspecified osteoarthritis type  M19.011 XR SHOULDER RIGHT (MIN 2 VIEWS)      Mild medial epicondylitis, treat with activity modification. The patient is overall doing well status post the above mentioned procedure. They need to continue with their exercises. If they have any questions or concerns the patient knows that they can call our office at any time. Discussed activity modification and where not to stress the shoulder. Keep his shoulder syndrome is much as possible. At this point I would like to send him for an FCE.   Work: Continue with restrictions of no lifting greater than 20 pounds below shoulder height no lifting greater than 15 pounds above shoulder height. Patient prescribed with Non-steroidal anti-inflammatories after review of risks and benefits. We discussed additional activity modification to help reduce pain for initial conservative treatment. Mobic  Return After FCE.      Debbie Londono MD  08/12/22

## 2022-08-12 ENCOUNTER — OFFICE VISIT (OUTPATIENT)
Dept: ORTHOPEDIC SURGERY | Age: 57
End: 2022-08-12

## 2022-08-12 DIAGNOSIS — Z96.611 STATUS POST TOTAL SHOULDER ARTHROPLASTY, RIGHT: Primary | ICD-10-CM

## 2022-08-12 DIAGNOSIS — M19.011 OSTEOARTHRITIS OF RIGHT SHOULDER, UNSPECIFIED OSTEOARTHRITIS TYPE: ICD-10-CM

## 2022-08-12 RX ORDER — MELOXICAM 7.5 MG/1
7.5 TABLET ORAL 2 TIMES DAILY PRN
Qty: 60 TABLET | Refills: 0 | Status: SHIPPED | OUTPATIENT
Start: 2022-08-12 | End: 2022-09-11

## 2022-08-12 NOTE — LETTER
[address] [facility]  [place] [address] [address]  Phone: [phone]  Fax: [phone]    Rogelio Lipscomb MD        August 12, 2022     Patient: Nilton Haynes   YOB: 1965   Date of Visit: 8/12/2022       To Whom It May Concern:    Work: Continue with restrictions of no lifting greater than 20 pounds below shoulder height no lifting greater than 15 pounds above shoulder height. If you have any questions or concerns, please don't hesitate to call.     Sincerely,        Rogelio Lipscomb MD

## 2022-08-12 NOTE — Clinical Note
[facility]  [address] [place] [address]  Phone: [phone]  Fax: [phone]    Mary Kate Hays MD        August 12, 2022     Patient: Milton Alcantara   YOB: 1965   Date of Visit: 8/12/2022       To Whom It May Concern: It is my medical opinion that Shea Ruth {Work release (duty restriction):38384}. If you have any questions or concerns, please don't hesitate to call.     Sincerely,        Mary Kate aHys MD

## 2022-08-31 ENCOUNTER — HOSPITAL ENCOUNTER (OUTPATIENT)
Dept: PHYSICAL THERAPY | Age: 57
Setting detail: RECURRING SERIES
Discharge: HOME | End: 2022-09-03
Payer: COMMERCIAL

## 2022-08-31 PROCEDURE — 97750 PHYSICAL PERFORMANCE TEST: CPT

## 2022-08-31 ASSESSMENT — PAIN SCALES - GENERAL: PAINLEVEL_OUTOF10: 7

## 2022-08-31 NOTE — THERAPY EVALUATION
Larry Kasper  : 1965  Primary: Jena Calles  Secondary:  [facility] [address] [address]  Phone: [phone]  Fax: [phone] Plan Frequency: 1 x only    Plan of Care/Certification Expiration Date: 22      PT Visit Info: Total # of Visits Approved: 1  Total # of Visits to Date: 1      Visit Count:  1    OUTPATIENT PHYSICAL THERAPY:OP NOTE TYPE: Initial Assessment and FCE  2022               Episode  Appt Desk         Treatment Diagnosis:  Pain in Right Shoulder (M25.511)  Stiffness of Right Shoulder, Not elsewhere classified (M25.611)  Primary OA right shoulder (M19.011)  Medical/Referring Diagnosis:  Presence of right artificial shoulder joint [Z96.611]  Post-traumatic osteoarthritis, right shoulder [F65.144]  Referring Physician:  Carroll Knox MD MD Orders:  PT Eval and Treat FCE  Return MD Appt:  not scheduled  Date of Onset:  No data recorded   Allergies:  Patient has no known allergies. Medications Last Reviewed:  2022     SUBJECTIVE   History of Injury/Illness (Reason for Referral):  Larry Kasper reports falling from a ladder onto his right shoulder in 2019. Dr. Carleen Finn completed a rotator cuff repair with extensive debridement of the glenoid and humeral head. Patient completed extensive rehab at [address] [place] [address]. He continued to have pain and ROM deficits. Dr. Maggie Arenas completed total right shoulder arthroplasty 21. Patient Stated Goal(s):  \"use my arm for sports and reaching\"  Initial:     7/10 Post Session:     4/10  Past Medical History/Comorbidities:   Mr. Itzel Moody  has a past medical history of Arthritis and GERD (gastroesophageal reflux disease). Mr. Itzel Moody  has a past surgical history that includes Rotator cuff repair (Right, ) and orthopedic surgery (late ).   Social History/Living Environment:   No data recorded   Prior Level of Function/Work/Activity:   Prior level of function: Independent  Current level of function: Mod I  Occupation: Workers comp  Type of Occupation: Van   No data [address] recorded   Learning:   Does the patient/guardian have any barriers to learning?: No barriers  Will there be a co-learner?: No  What is the preferred language of the patient/guardian?: English  Is an  required?: No  How does the patient/guardian prefer to learn new concepts?: Listening; Demonstration     Fall Risk Scale: Total Score: 25  Bass Fall Risk: Medium (25-44)   Dominant Side:  right handed      [facility] completes 140 minutes of physical performance testing today. Test report and recommended job modification form are scanned into medical record. ASSESSMENT   Initial Assessment:  FCE completed with the [facility]. PURPOSE OF ASSESSMENT  Mr. 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 Mr. Miles Vivar has been referred with the diagnoses of Osteoarthritis right shoulder and s/p total shoulder arthroplasty, right. He was present for evaluation on 8/31/2022 for a period of 2 hours and 10 minutes. RELIABILITY AND CONSISTENCY OF EFFORT  The results of this evaluation suggest that Mr. Miles Vivar gave a self-limited effort, with 33 consistency measures yielding a reliability score of 49 out of 72 (68%). Evaluee admits apprehension with heavier resistance. FUNCTIONAL ABILITIES  Evaluee's demonstrated abilities meet essential job demands for the following activities: Sitting, Standing, Walking, Bending, Reach Immediate (Front)  Right, Climbing Stairs.     FUNCTIONAL LIMITATIONS  Evaluee's demonstrated abilities do not meet essential job demands for the following activities: High Lift, Mid Lift, Low Lift, Full Lift, Carrying, Pushing, Pulling, Overall Strength, Balance, Reach Immediate (Front)  Left, Reach [place] [facility])  Right, [facility] Left.      Deficits were observed in the following range of motion tests: Shoulder Flexion Left, Shoulder Flexion Right, Shoulder Extension Left, Shoulder Extension Right, Shoulder Abduction Left, Shoulder Abduction Right, Shoulder Adduction Right, Shoulder External Rotation Left, Shoulder External Rotation Right, Shoulder Internal Rotation Left, Shoulder Internal Rotation Right. It is noted that Mr. Jam Pierce frequently reported more pain with left shoulder activities than the post-surgical right. Frequent Dynamic lifting results are 20 lbs for floor to waist and waist to chest; 10 lbs overhead. CONCLUSIONS  Test results today place Mr. Jam Pierce in the [employer] work category which falls below the job demand of heavy to very heavy duty. He has completed extensive rehab and would not expect short term improvement in capabilities. It is noted that job demands list sitting as occasional but driving 7-58 hours. Many tasks are downgraded from constant to frequent for test comparison, as the drive time would not allow for the other activities to be constant. Modification form is completed. Problem List: (Impacting functional limitations): Body Structures, Functions, Activity Limitations Requiring Skilled Therapeutic Intervention: Decreased tolerance to work activity     Therapy Prognosis:   No data recorded   Assessment Complexity:   Medium Complexity  PLAN   Effective Dates: 8/31/2022       Frequency/Duration: Plan Frequency: 1 x only         Outcome Measure: Tool Used: Disabilities of the Arm, Shoulder and Hand (DASH) Questionnaire - Quick Version  Score:  Initial: 32/55  Most Recent: X/55 (Date: -- )   Interpretation of Score: The DASH is designed to measure the activities of daily living in person's with upper extremity dysfunction or pain. Each section is scored on a 1-5 scale, 5 representing the greatest disability. The scores of each section are added together for a total score of 55.       Total Duration:  Time In: 0915  Time Out: Meggan Chakrabortyuel Alec [address] [facility], I certify that the treatment plan above will be carried out by a therapist or under their direction.   Thank you for this referral,  Stanislaw Gonzalez, PT     Referring Physician Signature: Etienne Armenta MD                    Post Session Pain  Charge Capture  PT Visit Info MD Guidelines  [place]

## 2022-09-21 ENCOUNTER — OFFICE VISIT (OUTPATIENT)
Dept: ORTHOPEDIC SURGERY | Age: 57
End: 2022-09-21

## 2022-09-21 DIAGNOSIS — Z96.611 STATUS POST TOTAL SHOULDER ARTHROPLASTY, RIGHT: Primary | ICD-10-CM

## 2022-09-21 DIAGNOSIS — M19.011 OSTEOARTHRITIS OF RIGHT SHOULDER, UNSPECIFIED OSTEOARTHRITIS TYPE: ICD-10-CM

## 2022-09-21 RX ORDER — DICLOFENAC SODIUM 75 MG/1
TABLET, DELAYED RELEASE ORAL
COMMUNITY

## 2022-09-21 RX ORDER — OXYCODONE HYDROCHLORIDE 5 MG/1
TABLET ORAL
COMMUNITY

## 2022-09-21 RX ORDER — CIPROFLOXACIN 500 MG/1
TABLET, FILM COATED ORAL
COMMUNITY

## 2022-09-21 RX ORDER — TAMSULOSIN HYDROCHLORIDE 0.4 MG/1
CAPSULE ORAL
COMMUNITY

## 2022-09-21 RX ORDER — FAMOTIDINE 40 MG/1
TABLET, FILM COATED ORAL
COMMUNITY

## 2022-09-21 RX ORDER — SULFAMETHOXAZOLE AND TRIMETHOPRIM 800; 160 MG/1; MG/1
TABLET ORAL
COMMUNITY

## 2022-09-21 RX ORDER — DICYCLOMINE HYDROCHLORIDE 10 MG/1
CAPSULE ORAL
COMMUNITY

## 2022-09-21 RX ORDER — HYDROCODONE BITARTRATE AND ACETAMINOPHEN 7.5; 325 MG/1; MG/1
TABLET ORAL
COMMUNITY

## 2022-09-21 NOTE — PROGRESS NOTES
Name: Carmen Josue  YOB: 1965  Gender: male  MRN: [id number]    CC:   Chief Complaint   Patient presents with    Follow-up     Recheck S/P right TSA and biceps tenodesis DOS 8-27-21, Recheck left shoulder        WORKERS COMPENSATION     HPI: Patient presents today for follow-up of bilateral shoulder. The patient is status post right total shoulder arthroplasty on 8- and left shoulder pain. He is here today to review results of functional capacity evaluation. No new changes. Left shoulder still gives him more trouble than this right now. Feels like it may have been related to how much she has to use it during his recovery. No Known Allergies  Past Medical History:   Diagnosis Date    Arthritis     GERD (gastroesophageal reflux disease)     History of      Past Surgical History:   Procedure Laterality Date    ORTHOPEDIC SURGERY  late 1990's    \"torn ligament\" \"reconstruction\" both knees    ROTATOR CUFF REPAIR Right 2020     Family History   Problem Relation Age of Onset    Heart Disease Mother      Social History     Socioeconomic History    Marital status:      Spouse name: Not on file    Number of children: Not on file    Years of education: Not on file    Highest education level: Not on file   Occupational History    Not on file   Tobacco Use    Smoking status: Never    Smokeless tobacco: Never   Substance and Sexual Activity    Alcohol use:  Yes     Alcohol/week: 16.0 standard drinks    Drug use: Yes     Types: Marijuana Candiss Littler)    Sexual activity: Not on file   Other Topics Concern    Not on file   Social History Narrative    Not on file     Social Determinants of Health     Financial Resource Strain: Not on file   Food Insecurity: Not on file   Transportation Needs: Not on file   Physical Activity: Not on file   Stress: Not on file   Social Connections: Not on file   Intimate Partner Violence: Not on file   Housing Stability: Not on file        No flowsheet data found.    Review of Systems  Non-contributory    PE:    No new changes on physical exam of either shoulder. FCE RESULTS FROM 8-31-22:  FCE was performed at [address] [place] [address]. Discussed that Mr. Pili Yeager gave a self limited effort with 33 consistency measures yielding a reliability score of 49 out of 72 (68% (.  Evaluate he admitted apprehension with heavier resistance. Evaluate he demonstrated abilities to meet essential job demands for the following activities: Sitting, standing, walking, bending, reaching immediately in front, climbing stairs. Functional limitations:  Abilities do not meet essential job demands for the following activities: High lift, mid lift, low lift, for lift, carrying, pushing, pulling, overall strength balance, reach immediately left, reach immediately overhead. Conclusion:  Test results place Mr. Pili Yeager in the medium work category which falls below the job demand of very heavy duty type of job . Josh Romero He has completed extensive rehab and would not expect short-term improvement capabilities. It is noted that job demands less sitting as an occasional night driving 3-71 hours. Many tasks are downgraded from constant or frequent for test comparison, as the drive time would not allow for the other activities to be constant. Modification form is completed. A/Plan:     ICD-10-CM    1. Status post total shoulder arthroplasty, right  Z96.611       2. Osteoarthritis of right shoulder, unspecified osteoarthritis type  M19.011          After review of the FCE as well as previous subjective findings and review of the fifth and 6 additions of the AMA guidelines of permanent impairment patient has a 24% impairment for the right upper extremity. This is taking into consideration functional history and physical exam.  Patient has permanent hardware on the form of metal and plastic as well as suture.   There is no plan for further surgery or other treatments at this time.  He may require over-the-counter anti-inflammatories over the next year. We will plan on at least yearly follow-up over the next 2 years. Work:   Right shoulder lifting restrictions or 20 pounds from floor to waist and waist to chest.  10 pounds or less over head for the right shoulder. Mr. Fransisco Morrell is capable of medium work category restrictions. Return in about 1 year (around 9/21/2023) for TSA x-ray francis Ocasio / Josefina.         Debbie Londono MD  09/21/22

## 2022-11-28 ENCOUNTER — HOSPITAL ENCOUNTER (EMERGENCY)
Age: 57
Discharge: HOME | End: 2022-11-28
Attending: EMERGENCY MEDICINE

## 2022-11-28 ENCOUNTER — APPOINTMENT (OUTPATIENT)
Dept: GENERAL RADIOLOGY | Age: 57
End: 2022-11-28

## 2022-11-28 VITALS
WEIGHT: 232 LBS | BODY MASS INDEX: 28.85 KG/M2 | HEIGHT: 75 IN | HEART RATE: 64 BPM | SYSTOLIC BLOOD PRESSURE: 115 MMHG | OXYGEN SATURATION: 98 % | RESPIRATION RATE: 16 BRPM | TEMPERATURE: 99.7 F | DIASTOLIC BLOOD PRESSURE: 85 MMHG

## 2022-11-28 DIAGNOSIS — R68.89 FLU-LIKE SYMPTOMS: Primary | ICD-10-CM

## 2022-11-28 DIAGNOSIS — R11.2 NAUSEA AND VOMITING, UNSPECIFIED VOMITING TYPE: ICD-10-CM

## 2022-11-28 LAB
ALBUMIN SERPL-MCNC: 4.2 G/DL (ref 3.5–5)
ALBUMIN/GLOB SERPL: 1 {RATIO} (ref 0.4–1.6)
ALP SERPL-CCNC: 93 U/L (ref 50–136)
ALT SERPL-CCNC: 28 U/L (ref 12–65)
ANION GAP SERPL CALC-SCNC: 7 MMOL/L (ref 2–11)
AST SERPL-CCNC: 16 U/L (ref 15–37)
B PERT DNA SPEC QL NAA+PROBE: NOT DETECTED
BASOPHILS # BLD: 0.1 K/UL (ref 0–0.2)
BASOPHILS NFR BLD: 0 % (ref 0–2)
BILIRUB SERPL-MCNC: 0.5 MG/DL (ref 0.2–1.1)
BORDETELLA PARAPERTUSSIS BY PCR: NOT DETECTED
BUN SERPL-MCNC: 9 MG/DL (ref 6–23)
C PNEUM DNA SPEC QL NAA+PROBE: NOT DETECTED
CALCIUM SERPL-MCNC: 10 MG/DL (ref 8.3–10.4)
CHLORIDE SERPL-SCNC: 106 MMOL/L (ref 101–110)
CO2 SERPL-SCNC: 25 MMOL/L (ref 21–32)
CREAT SERPL-MCNC: 1.1 MG/DL (ref 0.8–1.5)
DIFFERENTIAL METHOD BLD: ABNORMAL
EOSINOPHIL # BLD: 0 K/UL (ref 0–0.8)
EOSINOPHIL NFR BLD: 0 % (ref 0.5–7.8)
ERYTHROCYTE [DISTWIDTH] IN BLOOD BY AUTOMATED COUNT: 13.8 % (ref 11.9–14.6)
FLUAV SUBTYP SPEC NAA+PROBE: NOT DETECTED
FLUBV RNA SPEC QL NAA+PROBE: NOT DETECTED
GLOBULIN SER CALC-MCNC: 4.4 G/DL (ref 2.8–4.5)
GLUCOSE SERPL-MCNC: 134 MG/DL (ref 65–100)
HADV DNA SPEC QL NAA+PROBE: NOT DETECTED
HCOV 229E RNA SPEC QL NAA+PROBE: NOT DETECTED
HCOV HKU1 RNA SPEC QL NAA+PROBE: NOT DETECTED
HCOV NL63 RNA SPEC QL NAA+PROBE: NOT DETECTED
HCOV OC43 RNA SPEC QL NAA+PROBE: NOT DETECTED
HCT VFR BLD AUTO: 50.2 % (ref 41.1–50.3)
HGB BLD-MCNC: 16.4 G/DL (ref 13.6–17.2)
HMPV RNA SPEC QL NAA+PROBE: NOT DETECTED
HPIV1 RNA SPEC QL NAA+PROBE: NOT DETECTED
HPIV2 RNA SPEC QL NAA+PROBE: NOT DETECTED
HPIV3 RNA SPEC QL NAA+PROBE: NOT DETECTED
HPIV4 RNA SPEC QL NAA+PROBE: NOT DETECTED
IMM GRANULOCYTES # BLD AUTO: 0.1 K/UL (ref 0–0.5)
IMM GRANULOCYTES NFR BLD AUTO: 0 % (ref 0–5)
LIPASE SERPL-CCNC: 172 U/L (ref 73–393)
LYMPHOCYTES # BLD: 0.5 K/UL (ref 0.5–4.6)
LYMPHOCYTES NFR BLD: 3 % (ref 13–44)
M PNEUMO DNA SPEC QL NAA+PROBE: NOT DETECTED
MCH RBC QN AUTO: 30.3 PG (ref 26.1–32.9)
MCHC RBC AUTO-ENTMCNC: 32.7 G/DL (ref 31.4–35)
MCV RBC AUTO: 92.6 FL (ref 82–102)
MONOCYTES # BLD: 0.8 K/UL (ref 0.1–1.3)
MONOCYTES NFR BLD: 5 % (ref 4–12)
NEUTS SEG # BLD: 14.3 K/UL (ref 1.7–8.2)
NEUTS SEG NFR BLD: 92 % (ref 43–78)
NRBC # BLD: 0 K/UL (ref 0–0.2)
PLATELET # BLD AUTO: 340 K/UL (ref 150–450)
PMV BLD AUTO: 10.2 FL (ref 9.4–12.3)
POTASSIUM SERPL-SCNC: 3.5 MMOL/L (ref 3.5–5.1)
PROT SERPL-MCNC: 8.6 G/DL (ref 6.3–8.2)
RBC # BLD AUTO: 5.42 M/UL (ref 4.23–5.6)
RSV RNA SPEC QL NAA+PROBE: NOT DETECTED
RV+EV RNA SPEC QL NAA+PROBE: NOT DETECTED
SARS-COV-2 RNA RESP QL NAA+PROBE: NOT DETECTED
SODIUM SERPL-SCNC: 138 MMOL/L (ref 133–143)
WBC # BLD AUTO: 15.8 K/UL (ref 4.3–11.1)

## 2022-11-28 PROCEDURE — 99284 EMERGENCY DEPT VISIT MOD MDM: CPT

## 2022-11-28 PROCEDURE — 85025 COMPLETE CBC W/AUTO DIFF WBC: CPT

## 2022-11-28 PROCEDURE — 2580000003 HC RX 258: Performed by: EMERGENCY MEDICINE

## 2022-11-28 PROCEDURE — 96374 THER/PROPH/DIAG INJ IV PUSH: CPT

## 2022-11-28 PROCEDURE — 96361 HYDRATE IV INFUSION ADD-ON: CPT

## 2022-11-28 PROCEDURE — 80053 COMPREHEN METABOLIC PANEL: CPT

## 2022-11-28 PROCEDURE — 6370000000 HC RX 637 (ALT 250 FOR IP): Performed by: EMERGENCY MEDICINE

## 2022-11-28 PROCEDURE — 6360000002 HC RX W HCPCS: Performed by: EMERGENCY MEDICINE

## 2022-11-28 PROCEDURE — 83690 ASSAY OF LIPASE: CPT

## 2022-11-28 PROCEDURE — 96375 TX/PRO/DX INJ NEW DRUG ADDON: CPT

## 2022-11-28 PROCEDURE — 0202U NFCT DS 22 TRGT SARS-COV-2: CPT

## 2022-11-28 RX ORDER — IBUPROFEN 600 MG/1
600 TABLET ORAL 4 TIMES DAILY PRN
Qty: 360 TABLET | Refills: 1 | Status: SHIPPED | OUTPATIENT
Start: 2022-11-28

## 2022-11-28 RX ORDER — ONDANSETRON 4 MG/1
4 TABLET, FILM COATED ORAL DAILY PRN
Qty: 30 TABLET | Refills: 0 | Status: SHIPPED | OUTPATIENT
Start: 2022-11-28

## 2022-11-28 RX ORDER — 0.9 % SODIUM CHLORIDE 0.9 %
1000 INTRAVENOUS SOLUTION INTRAVENOUS ONCE
Status: COMPLETED | OUTPATIENT
Start: 2022-11-28 | End: 2022-11-28

## 2022-11-28 RX ORDER — MAGNESIUM HYDROXIDE/ALUMINUM HYDROXICE/SIMETHICONE 120; 1200; 1200 MG/30ML; MG/30ML; MG/30ML
30 SUSPENSION ORAL
Status: COMPLETED | OUTPATIENT
Start: 2022-11-28 | End: 2022-11-28

## 2022-11-28 RX ORDER — KETOROLAC TROMETHAMINE 15 MG/ML
15 INJECTION, SOLUTION INTRAMUSCULAR; INTRAVENOUS
Status: COMPLETED | OUTPATIENT
Start: 2022-11-28 | End: 2022-11-28

## 2022-11-28 RX ORDER — ONDANSETRON 2 MG/ML
4 INJECTION INTRAMUSCULAR; INTRAVENOUS ONCE
Status: COMPLETED | OUTPATIENT
Start: 2022-11-28 | End: 2022-11-28

## 2022-11-28 RX ADMIN — ONDANSETRON 4 MG: 2 INJECTION INTRAMUSCULAR; INTRAVENOUS at 14:19

## 2022-11-28 RX ADMIN — KETOROLAC TROMETHAMINE 15 MG: 15 INJECTION, SOLUTION INTRAMUSCULAR; INTRAVENOUS at 14:29

## 2022-11-28 RX ADMIN — ALUMINUM HYDROXIDE, MAGNESIUM HYDROXIDE, AND SIMETHICONE 30 ML: 200; 200; 20 SUSPENSION ORAL at 14:30

## 2022-11-28 RX ADMIN — SODIUM CHLORIDE 1000 ML: 9 INJECTION, SOLUTION INTRAVENOUS at 14:29

## 2022-11-28 ASSESSMENT — PAIN - FUNCTIONAL ASSESSMENT: PAIN_FUNCTIONAL_ASSESSMENT: 0-10

## 2022-11-28 NOTE — ED NOTES
I have reviewed discharge instructions with the patient. The patient verbalized understanding. Patient left ED via Discharge Method: ambulatory to Home with spouse    Opportunity for questions and clarification provided. Patient given 2 E[employer]. To continue your aftercare when you leave the hospital, you may receive an automated call from our care team to check in on how you are doing. This is a free service and part of our promise to provide the best care and service to meet your aftercare needs.  If you have questions, or wish to unsubscribe from this service please call [phone]. Thank you for Choosing our [facility] Emergency Department.         Selina Gandhi RN  11/28/22 4140

## 2022-11-28 NOTE — ED PROVIDER NOTES
Emergency Department Provider Note                   PCP:                Kadie Ariza MD               Age: 65 y.o. Sex: male       ICD-10-CM    1. Flu-like symptoms  R68.89       2. Nausea and vomiting, unspecified vomiting type  R11.2           DISPOSITION Decision To Discharge 11/28/2022 03:33:33 PM        MDM  Number of Diagnoses or Management Options  Flu-like symptoms  Nausea and vomiting, unspecified vomiting type  Diagnosis management comments: Patient presents with a flulike illness and active vomiting in triage. Differential diagnosis includes COVID-19, influenza, RSV, nonspecific viral etiology, stomach flu, dehydration. Patient has no abdominal pain to suggest an acute intra-abdominal process. However, labs are ordered to rule out such things. Patient noted to have a slight leukocytosis. He was negative for COVID, influenza.,  And other respiratory illnesses seen on our micro swab. Patient's lipase was normal.  His chemistry was within normal range without evidence of acute renal failure. Patient was treated with IV fluids and antiemetics. He had no further vomiting was able to tolerate ice chips. Patient was eager to go home once he was feeling better. I offered the patient and his wife reassurance. He is to isolate, drink plenty of fluids at home, and alternate ibuprofen and Tylenol as needed. Return precautions were outlined with the family.                Orders Placed This Encounter   Procedures    Respiratory Panel, Molecular, with COVID-19 (Restricted: peds pts or suitable admitted adults)    CBC with Auto Differential    CMP    Lipase        Medications   ondansetron (ZOFRAN) injection 4 mg (4 mg IntraVENous Given 11/28/22 1419)   0.9 % sodium chloride bolus (0 mLs IntraVENous Stopped 11/28/22 1606)   ketorolac (TORADOL) injection 15 mg (15 mg IntraVENous Given 11/28/22 1429)   aluminum & magnesium hydroxide-simethicone (MAALOX) 200-200-20 MG/5ML suspension 30 mL (30 mLs Oral Given 11/28/22 1430)       Discharge Medication List as of 11/28/2022  3:40 PM        START taking these medications    Details   ondansetron (ZOFRAN) 4 MG tablet Take 1 tablet by mouth daily as needed for Nausea or Vomiting, Disp-30 tablet, R-0Normal      ibuprofen (ADVIL;MOTRIN) 600 MG tablet Take 1 tablet by mouth 4 times daily as needed for Pain, Disp-360 tablet, R-1Normal              Marcella Garrett is a 65 y.o. male who presents to the Emergency Department with chief complaint of    Chief Complaint   Patient presents with    URI      Patient presents with flulike illness since Saturday. They had a Thanksgiving feast with her family. Her 2 granddaughters visited or sick. One of them had a common cold. The second 1 tested positive for flu. Patient has had nausea and vomiting for the past 2 days. He can keep some water down, but no food. He has had some mild loose stool, but no constipation. No blood in the stool. No problems with urination. Patient has not eaten much in 2 days. He tried some NyQuil cold and flu this morning, but vomited. Patient is actively vomiting in triage. Patient has a history of dysphagia. Patient has a mild cough and complains of some mild shortness of breath. No abdominal pain. No daily alcohol. No abdominal pain. Review of Systems   All other systems reviewed and are negative. Past Medical History:   Diagnosis Date    Arthritis     GERD (gastroesophageal reflux disease)     History of         Past Surgical History:   Procedure Laterality Date    ORTHOPEDIC SURGERY  late 1990's    \"torn ligament\" \"reconstruction\" both knees    ROTATOR CUFF REPAIR Right 2020        Family History   Problem Relation Age of Onset    Heart Disease Mother         Social History     Socioeconomic History    Marital status:    Tobacco Use    Smoking status: Never    Smokeless tobacco: Never   Substance and Sexual Activity    Alcohol use:  Yes     Alcohol/week: 16.0 standard drinks    Drug use: Yes     Types: Marijuana Juan A Matamoros)         Patient has no known allergies.      Discharge Medication List as of 11/28/2022  3:40 PM        CONTINUE these medications which have NOT CHANGED    Details   ciprofloxacin (CIPRO) 500 MG tablet ciprofloxacin 500 mg tablet   TAKE 1 TABLET BY MOUTH TWICE DAILY FOR 10 DAYSHistorical Med      diclofenac (VOLTAREN) 75 MG EC tablet diclofenac sodium 75 mg tablet,delayed release   TAKE 1 TABLET BY MOUTH TWICE DAILY AS NEEDEDHistorical Med      dicyclomine (BENTYL) 10 MG capsule dicyclomine 10 mg capsule   TAKE 1 CAPSULE BY MOUTH 4 TIMES DAILY FOR 5 DAYSHistorical Med      famotidine (PEPCID) 40 MG tablet famotidine 40 mg tablet   TAKE 1 TABLET BY MOUTH ONCE DAILYHistorical Med      HYDROcodone-acetaminophen (NORCO) 7.5-325 MG per tablet hydrocodone 7.5 mg-acetaminophen 325 mg tablet   TAKE 1 TABLET BY MOUTH EVERY 4 TO 6 HOURS AS NEEDEDHistorical Med      oxyCODONE (ROXICODONE) 5 MG immediate release tablet oxycodone 5 mg tablet   TAKE 1 TABLET BY MOUTH EVERY 4 TO 6 HOURS AS NEEDEDHistorical Med      sulfamethoxazole-trimethoprim (BACTRIM DS;SEPTRA DS) 800-160 MG per tablet sulfamethoxazole 800 mg-trimethoprim 160 mg tablet   TAKE 1 TABLET BY MOUTH EVERY 12 HOURS FOR 7 DAYSHistorical Med      tamsulosin (FLOMAX) 0.4 MG capsule tamsulosin 0.4 mg capsule   TAKE 1 CAPSULE BY MOUTH ONCE DAILYHistorical Med      meloxicam (MOBIC) 7.5 MG tablet Take 1 tablet by mouth 2 times daily as needed for Pain Take 1 tablet po BID for 2 weeks as needed for pain, Disp-60 tablet, R-0Normal      bisacodyl (DULCOLAX) 5 MG EC tablet Take 20 mg by mouth onceHistorical Med      magnesium citrate (CITROMA) SOLN Take 296 mLs by mouth onceHistorical Med      ondansetron (ZOFRAN-ODT) 4 MG disintegrating tablet Take 4 mg by mouth every 8 hours as neededHistorical Med      polyethylene glycol (GLYCOLAX) 17 GM/SCOOP powder Take 238 g by mouth onceHistorical Med      promethazine (PHENERGAN) 25 MG tablet Take 25 mg by mouth every 6 hours as neededHistorical Med      senna-docusate (PERICOLACE) 8.6-50 MG per tablet Take 1 tablet by mouth dailyHistorical Med              Vitals signs and nursing note reviewed. No data found. Physical Exam  Vitals and nursing note reviewed. Constitutional:       General: He is not in acute distress. Appearance: Normal appearance. He is not toxic-appearing. HENT:      Head: Normocephalic and atraumatic. Mouth/Throat:      Mouth: Mucous membranes are moist.   Eyes:      Extraocular Movements: Extraocular movements intact. Conjunctiva/sclera: Conjunctivae normal.      Pupils: Pupils are equal, round, and reactive to light. Cardiovascular:      Rate and Rhythm: Normal rate and regular rhythm. Pulmonary:      Effort: Pulmonary effort is normal.      Breath sounds: Normal breath sounds. Abdominal:      General: Bowel sounds are normal. There is no distension. Palpations: Abdomen is soft. Tenderness: There is no abdominal tenderness. There is no guarding. Musculoskeletal:         General: No deformity. Normal range of motion. Cervical back: Normal range of motion and neck supple. Skin:     General: Skin is warm and dry. Capillary Refill: Capillary refill takes less than 2 seconds. Coloration: Skin is not jaundiced. Neurological:      General: No focal deficit present. Mental Status: He is alert and oriented to person, place, and time. Psychiatric:         Mood and Affect: Mood normal.         Behavior: Behavior normal.         Thought Content:  Thought content normal.        Procedures    Results for orders placed or performed during the hospital encounter of 11/28/22   Respiratory Panel, Molecular, with COVID-19 (Restricted: peds pts or suitable admitted adults)    Specimen: Nasopharyngeal   Result Value Ref Range    Adenovirus by PCR NOT DETECTED NOTDET      Coronavirus 229E by PCR NOT DETECTED NOTDET      Coronavirus HKU1 by PCR NOT DETECTED NOTDET      Coronavirus NL63 by PCR NOT DETECTED NOTDET      Coronavirus OC43 by PCR NOT DETECTED NOTDET      SARS-CoV-2, PCR NOT DETECTED NOTDET      Human Metapneumovirus by PCR NOT DETECTED NOTDET      Rhinovirus Enterovirus PCR NOT DETECTED NOTDET      Influenza A by PCR NOT DETECTED NOTDET      Influenza B PCR NOT DETECTED NOTDET      Parainfluenza 1 PCR NOT DETECTED NOTDET      Parainfluenza 2 PCR NOT DETECTED NOTDET      Parainfluenza 3 PCR NOT DETECTED NOTDET      Parainfluenza 4 PCR NOT DETECTED NOTDET      Respiratory Syncytial Virus by PCR NOT DETECTED NOTDET      Bordetella parapertussis by PCR NOT DETECTED NOTDET      Bordetella pertussis by PCR NOT DETECTED NOTDET      Chlamydophila Pneumonia PCR NOT DETECTED NOTDET      Mycoplasma pneumo by PCR NOT DETECTED NOTDET     CBC with Auto Differential   Result Value Ref Range    WBC 15.8 (H) 4.3 - 11.1 K/uL    RBC 5.42 4.23 - 5.6 M/uL    Hemoglobin 16.4 13.6 - 17.2 g/dL    Hematocrit 50.2 41.1 - 50.3 %    MCV 92.6 82 - 102 FL    MCH 30.3 26.1 - 32.9 PG    MCHC 32.7 31.4 - 35.0 g/dL    RDW 13.8 11.9 - 14.6 %    Platelets 135 233 - 048 K/uL    MPV 10.2 9.4 - 12.3 FL    nRBC 0.00 0.0 - 0.2 K/uL    Differential Type AUTOMATED      Seg Neutrophils 92 (H) 43 - 78 %    Lymphocytes 3 (L) 13 - 44 %    Monocytes 5 4.0 - 12.0 %    Eosinophils % 0 (L) 0.5 - 7.8 %    Basophils 0 0.0 - 2.0 %    Immature Granulocytes 0 0.0 - 5.0 %    Segs Absolute 14.3 (H) 1.7 - 8.2 K/UL    Absolute Lymph # 0.5 0.5 - 4.6 K/UL    Absolute Mono # 0.8 0.1 - 1.3 K/UL    Absolute Eos # 0.0 0.0 - 0.8 K/UL    Basophils Absolute 0.1 0.0 - 0.2 K/UL    Absolute Immature Granulocyte 0.1 0.0 - 0.5 K/UL   CMP   Result Value Ref Range    Sodium 138 133 - 143 mmol/L    Potassium 3.5 3.5 - 5.1 mmol/L    Chloride 106 101 - 110 mmol/L    CO2 25 21 - 32 mmol/L    Anion Gap 7 2 - 11 mmol/L    Glucose 134 (H) 65 - 100 mg/dL    BUN 9 6 - 23 MG/DL    Creatinine 1. 10 0.8 - 1.5 MG/DL    Est, Glom Filt Rate >60 >60 ml/min/1.73m2    Calcium 10.0 8.3 - 10.4 MG/DL    Total Bilirubin 0.5 0.2 - 1.1 MG/DL    ALT 28 12 - 65 U/L    AST 16 15 - 37 U/L    Alk Phosphatase 93 50 - 136 U/L    Total Protein 8.6 (H) 6.3 - 8.2 g/dL    Albumin 4.2 3.5 - 5.0 g/dL    Globulin 4.4 2.8 - 4.5 g/dL    Albumin/Globulin Ratio 1.0 0.4 - 1.6     Lipase   Result Value Ref Range    Lipase 172 73 - 393 U/L        No orders to display                       Voice dictation software was used during the making of this note. This software is not perfect and grammatical and other typographical errors may be present. This note has not been completely proofread for errors.      Sandra Vazquez MD  11/28/22 5206       Sandra Vazquez MD  12/20/22 9021

## 2022-11-28 NOTE — DISCHARGE INSTRUCTIONS
I'm sorry that you're feeling so bad. Your flu and COVID tests were negative. There is a slight elevation of the white blood count, which means you have some infection that you're fighting. I'll call you at home with the rest of the blood test results. Drink lots of fluids. Use the zofran for nausea. Alternate tylenol and motrin as needed for bodyaches. They will help! Come back to the ER for persistent vomiting, high fevers, or not getting better within a week.

## 2022-11-28 NOTE — ED TRIAGE NOTES
Patient ambulatory to triage with CO NV fatigue and fatigue with cough and SOB. Reports exposure to flu.

## 2022-12-10 NOTE — ED PROVIDER NOTES
Patient has a history of arthritis only, states he has been having 5 days of nausea and vomiting when he eats. He states he was seen in the emergency department yesterday for this. He developed chest pain and some dizziness last night. He states it has been constant left-sided nonradiating pressure without any aggravating or alleviating factors. He states it is currently an 8/10 in intensity. He denies similar symptoms in the past.    Patient was seen in triage by myself. A brief history was done and orders were placed. The patient will await further evaluation by a physician once in a room. The history is provided by the patient. Vomiting    This is a new problem. The current episode started more than 2 days ago. The problem occurs 5 to 10 times per day. The problem has been gradually worsening. The emesis has an appearance of stomach contents and clear. There has been no fever. Associated symptoms include a fever (Subjective ), abdominal pain, headaches and headaches. Pertinent negatives include no chills, no sweats, no diarrhea, no arthralgias, no myalgias, no cough and no URI. His pertinent negatives include no irritable bowel syndrome, no inflammatory bowel disease, no short gut syndrome, no bowel resection, no recent abdominal surgery, no malabsorption, no gastric bypass and no DM.         Past Medical History:   Diagnosis Date    Arthritis        Past Surgical History:   Procedure Laterality Date    HX ORTHOPAEDIC  late 1990's    \"torn ligament\" \"reconstruction\" both knees         Family History:   Problem Relation Age of Onset    Heart Disease Mother        Social History     Socioeconomic History    Marital status:      Spouse name: Not on file    Number of children: Not on file    Years of education: Not on file    Highest education level: Not on file   Occupational History    Not on file   Social Needs    Financial resource strain: Not on file    Food insecurity     Worry: Not on file     Inability: Not on file    Transportation needs     Medical: Not on file     Non-medical: Not on file   Tobacco Use    Smoking status: Never Smoker    Smokeless tobacco: Never Used   Substance and Sexual Activity    Alcohol use: Yes     Alcohol/week: 16.0 standard drinks     Types: 14 Cans of beer, 2 Shots of liquor per week    Drug use: Not Currently     Types: Marijuana     Comment: last use approximately one month ago    Sexual activity: Not on file   Lifestyle    Physical activity     Days per week: Not on file     Minutes per session: Not on file    Stress: Not on file   Relationships    Social connections     Talks on phone: Not on file     Gets together: Not on file     Attends Nondenominational service: Not on file     Active member of club or organization: Not on file     Attends meetings of clubs or organizations: Not on file     Relationship status: Not on file    Intimate partner violence     Fear of current or ex partner: Not on file     Emotionally abused: Not on file     Physically abused: Not on file     Forced sexual activity: Not on file   Other Topics Concern    Not on file   Social History Narrative    Not on file         ALLERGIES: Patient has no known allergies. Review of Systems   Constitutional: Positive for appetite change, fatigue and fever (Subjective ). Negative for chills. Respiratory: Negative for cough. Gastrointestinal: Positive for abdominal pain, nausea and vomiting. Negative for blood in stool, constipation and diarrhea. Genitourinary: Negative for dysuria and frequency. Musculoskeletal: Negative for arthralgias and myalgias. Neurological: Positive for headaches. All other systems reviewed and are negative. Vitals:    08/23/20 1340   BP: 125/85   Pulse: (!) 57   Resp: 16   Temp: 99 °F (37.2 °C)   SpO2: 99%   Weight: 104.3 kg (230 lb)   Height: 6' 2\" (1.88 m)            Physical Exam  Vitals signs and nursing note reviewed.    Constitutional: General: He is in acute distress. Appearance: He is well-developed. HENT:      Head: Normocephalic and atraumatic. Right Ear: Tympanic membrane and external ear normal.      Left Ear: Tympanic membrane and external ear normal.      Nose: Nose normal.      Mouth/Throat:      Mouth: Mucous membranes are moist.   Eyes:      Extraocular Movements: Extraocular movements intact. Conjunctiva/sclera: Conjunctivae normal.      Pupils: Pupils are equal, round, and reactive to light. Neck:      Musculoskeletal: Normal range of motion and neck supple. Cardiovascular:      Rate and Rhythm: Normal rate and regular rhythm. Heart sounds: Normal heart sounds. Pulmonary:      Effort: Pulmonary effort is normal. No respiratory distress. Breath sounds: Normal breath sounds. No wheezing, rhonchi or rales. Abdominal:      General: Abdomen is flat. Bowel sounds are normal. There is no distension. Palpations: Abdomen is soft. There is no hepatomegaly or mass. Tenderness: There is abdominal tenderness in the right upper quadrant and epigastric area. There is no right CVA tenderness, left CVA tenderness, guarding or rebound. Negative signs include McBurney's sign. Hernia: No hernia is present. Musculoskeletal: Normal range of motion. Skin:     General: Skin is warm and dry. Capillary Refill: Capillary refill takes less than 2 seconds. Neurological:      Mental Status: He is alert and oriented to person, place, and time.           MDM  Number of Diagnoses or Management Options  Acute abdominal pain: new and requires workup  Non-intractable vomiting with nausea, unspecified vomiting type: new and requires workup  Urinary tract infection without hematuria, site unspecified: new and requires workup     Amount and/or Complexity of Data Reviewed  Clinical lab tests: reviewed and ordered  Tests in the radiology section of CPT®: reviewed and ordered  Review and summarize past medical records: yes  Independent visualization of images, tracings, or specimens: yes    Risk of Complications, Morbidity, and/or Mortality  Presenting problems: moderate  Diagnostic procedures: moderate  Management options: moderate    Patient Progress  Patient progress: improved         Procedures    The patient was observed in the ED. Patient tolerating liquids without difficulty prior to discharge. We will write him a prescription for Phenergan suppositories should he be in unable to keep the Phenergan down at home. Results Reviewed:      Recent Results (from the past 24 hour(s))   LIPASE    Collection Time: 08/23/20  2:06 PM   Result Value Ref Range    Lipase 169 73 - 393 U/L   CBC WITH AUTOMATED DIFF    Collection Time: 08/23/20  2:07 PM   Result Value Ref Range    WBC 7.2 4.3 - 11.1 K/uL    RBC 5.25 4.23 - 5.6 M/uL    HGB 15.7 13.6 - 17.2 g/dL    HCT 47.3 41.1 - 50.3 %    MCV 90.1 79.6 - 97.8 FL    MCH 29.9 26.1 - 32.9 PG    MCHC 33.2 31.4 - 35.0 g/dL    RDW 13.3 11.9 - 14.6 %    PLATELET 022 710 - 936 K/uL    MPV 9.6 9.4 - 12.3 FL    ABSOLUTE NRBC 0.00 0.0 - 0.2 K/uL    DF AUTOMATED      NEUTROPHILS 68 43 - 78 %    LYMPHOCYTES 22 13 - 44 %    MONOCYTES 8 4.0 - 12.0 %    EOSINOPHILS 1 0.5 - 7.8 %    BASOPHILS 1 0.0 - 2.0 %    IMMATURE GRANULOCYTES 0 0.0 - 5.0 %    ABS. NEUTROPHILS 4.9 1.7 - 8.2 K/UL    ABS. LYMPHOCYTES 1.6 0.5 - 4.6 K/UL    ABS. MONOCYTES 0.6 0.1 - 1.3 K/UL    ABS. EOSINOPHILS 0.0 0.0 - 0.8 K/UL    ABS. BASOPHILS 0.1 0.0 - 0.2 K/UL    ABS. IMM.  GRANS. 0.0 0.0 - 0.5 K/UL   METABOLIC PANEL, COMPREHENSIVE    Collection Time: 08/23/20  2:07 PM   Result Value Ref Range    Sodium 140 136 - 145 mmol/L    Potassium 3.7 3.5 - 5.1 mmol/L    Chloride 105 98 - 107 mmol/L    CO2 26 21 - 32 mmol/L    Anion gap 9 7 - 16 mmol/L    Glucose 104 (H) 65 - 100 mg/dL    BUN 14 6 - 23 MG/DL    Creatinine 1.08 0.8 - 1.5 MG/DL    GFR est AA >60 >60 ml/min/1.73m2    GFR est non-AA >60 >60 ml/min/1.73m2    Calcium 9.8 8.3 - 10.4 MG/DL    Bilirubin, total 0.6 0.2 - 1.1 MG/DL    ALT (SGPT) 30 12 - 65 U/L    AST (SGOT) 15 15 - 37 U/L    Alk. phosphatase 89 50 - 136 U/L    Protein, total 8.4 (H) 6.3 - 8.2 g/dL    Albumin 4.0 3.5 - 5.0 g/dL    Globulin 4.4 (H) 2.3 - 3.5 g/dL    A-G Ratio 0.9 (L) 1.2 - 3.5     TROPONIN-HIGH SENSITIVITY    Collection Time: 08/23/20  2:07 PM   Result Value Ref Range    Troponin-High Sensitivity 5.7 0 - 14 pg/mL   MAGNESIUM    Collection Time: 08/23/20  2:07 PM   Result Value Ref Range    Magnesium 2.3 1.8 - 2.4 mg/dL   URINE MICROSCOPIC    Collection Time: 08/23/20  2:39 PM   Result Value Ref Range    WBC 20-50 0 /hpf    RBC 0-3 0 /hpf    Epithelial cells 0-3 0 /hpf    Bacteria 4+ (H) 0 /hpf    Casts HYALINE 0 /lpf    Crystals, urine 0 0 /LPF    Mucus 0 0 /lpf    Other observations RESULTS VERIFIED MANUALLY       US ABD LTD   Final Result   IMPRESSION:  Unremarkable right quadrant abdominal ultrasound. Note, examination   is limited in that the pancreas was not visualized. I discussed the results of all labs, procedures, radiographs, and treatments with the patient and available family. Treatment plan is agreed upon and the patient is ready for discharge. All voiced understanding of the discharge plan and medication instructions or changes as appropriate. Questions about treatment in the ED were answered. All were encouraged to return should symptoms worsen or new problems develop. [email or web address]

## 2023-04-18 ENCOUNTER — OFFICE VISIT (OUTPATIENT)
Dept: ORTHOPEDIC SURGERY | Age: 58
End: 2023-04-18

## 2023-04-18 DIAGNOSIS — Z96.611 STATUS POST TOTAL SHOULDER ARTHROPLASTY, RIGHT: Primary | ICD-10-CM

## 2023-04-18 DIAGNOSIS — M19.011 OSTEOARTHRITIS OF RIGHT SHOULDER, UNSPECIFIED OSTEOARTHRITIS TYPE: ICD-10-CM

## 2023-04-18 DIAGNOSIS — S46.011A TRAUMATIC ROTATOR CUFF TEAR, RIGHT, INITIAL ENCOUNTER: ICD-10-CM

## 2023-04-18 NOTE — PROGRESS NOTES
Name: Heike Thibodeaux  YOB: 1965  Gender: male  MRN: [id number]    CC:   Chief Complaint   Patient presents with    Follow-up     W/C recheck B/L shoulders  S/P right TSA, biceps tenodesis DOS 8-27-21  Left shoulder      WORKERS COMP     HPI: Patient presents for follow-up of bilateral shoulder pain. Patient is status post right total shoulder arthroplasty on 8- and was last seen on 9- to review FCE. Left shoulder continues to be symptomatic at his last visit. 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 When one of them tripped the other lost control and he was pushed into the wall while trying to stop the dresser from falling. This has significantly increased his discomfort and he now has a feeling that he cannot really control his shoulder. He does note some tingling in the arm. Does note popping and clicking. He states that it feels like it is going to dislocate. His wife states he has been having some discomfort over the last month as well. No Known Allergies  Past Medical History:   Diagnosis Date    Arthritis     GERD (gastroesophageal reflux disease)     History of      Past Surgical History:   Procedure Laterality Date    ORTHOPEDIC SURGERY  late 1990's    \"torn ligament\" \"reconstruction\" both knees    ROTATOR CUFF REPAIR Right 2020     Family History   Problem Relation Age of Onset    Heart Disease Mother      Social History     Socioeconomic History    Marital status:      Spouse name: Not on file    Number of children: Not on file    Years of education: Not on file    Highest education level: Not on file   Occupational History    Not on file   Tobacco Use    Smoking status: Never    Smokeless tobacco: Never   Substance and Sexual Activity    Alcohol use: Yes     Alcohol/week: 16.0 standard drinks    Drug use:  Yes

## 2023-05-02 ENCOUNTER — HOSPITAL ENCOUNTER (OUTPATIENT)
Dept: GENERAL RADIOLOGY | Age: 58
Discharge: HOME | End: 2023-05-05
Payer: COMMERCIAL

## 2023-05-02 ENCOUNTER — HOSPITAL ENCOUNTER (OUTPATIENT)
Dept: CT IMAGING | Age: 58
Discharge: HOME | End: 2023-05-05
Payer: COMMERCIAL

## 2023-05-02 VITALS
DIASTOLIC BLOOD PRESSURE: 70 MMHG | OXYGEN SATURATION: 97 % | SYSTOLIC BLOOD PRESSURE: 114 MMHG | RESPIRATION RATE: 20 BRPM | HEART RATE: 67 BPM | TEMPERATURE: 98 F

## 2023-05-02 DIAGNOSIS — M19.011 OSTEOARTHRITIS OF RIGHT SHOULDER, UNSPECIFIED OSTEOARTHRITIS TYPE: ICD-10-CM

## 2023-05-02 DIAGNOSIS — S46.011A TRAUMATIC ROTATOR CUFF TEAR, RIGHT, INITIAL ENCOUNTER: ICD-10-CM

## 2023-05-02 DIAGNOSIS — Z96.611 STATUS POST TOTAL SHOULDER ARTHROPLASTY, RIGHT: ICD-10-CM

## 2023-05-02 DIAGNOSIS — Z96.611 STATUS POST TOTAL SHOULDER ARTHROPLASTY, RIGHT: Primary | ICD-10-CM

## 2023-05-02 PROCEDURE — 23350 INJECTION FOR SHOULDER X-RAY: CPT

## 2023-05-02 PROCEDURE — 2500000003 HC RX 250 WO HCPCS: Performed by: ORTHOPAEDIC SURGERY

## 2023-05-02 PROCEDURE — 2580000003 HC RX 258: Performed by: ORTHOPAEDIC SURGERY

## 2023-05-02 PROCEDURE — 6360000004 HC RX CONTRAST MEDICATION: Performed by: ORTHOPAEDIC SURGERY

## 2023-05-02 PROCEDURE — 73201 CT UPPER EXTREMITY W/DYE: CPT

## 2023-05-02 RX ORDER — SODIUM CHLORIDE 9 MG/ML
10 INJECTION INTRAVENOUS 2 TIMES DAILY
Status: DISCONTINUED | OUTPATIENT
Start: 2023-05-02 | End: 2023-05-02 | Stop reason: HOSPADM

## 2023-05-02 RX ORDER — LIDOCAINE HYDROCHLORIDE 20 MG/ML
5 INJECTION, SOLUTION INFILTRATION; PERINEURAL ONCE
Status: COMPLETED | OUTPATIENT
Start: 2023-05-02 | End: 2023-05-02

## 2023-05-02 RX ADMIN — LIDOCAINE HYDROCHLORIDE 5 ML: 20 INJECTION, SOLUTION INFILTRATION; PERINEURAL at 11:53

## 2023-05-02 RX ADMIN — SODIUM CHLORIDE, PRESERVATIVE FREE 5 ML: 5 INJECTION INTRAVENOUS at 11:53

## 2023-05-02 RX ADMIN — IOPAMIDOL 10 ML: 612 INJECTION, SOLUTION INTRATHECAL at 11:52

## 2023-05-02 ASSESSMENT — PAIN - FUNCTIONAL ASSESSMENT: PAIN_FUNCTIONAL_ASSESSMENT: 0-10

## 2023-05-05 ENCOUNTER — TELEPHONE (OUTPATIENT)
Dept: ORTHOPEDIC SURGERY | Age: 58
End: 2023-05-05

## 2023-05-05 NOTE — TELEPHONE ENCOUNTER
She has questions about the procedures he is scheduled for. There seems to be some confusion. Please give scheduling a call.

## 2023-05-31 ENCOUNTER — OFFICE VISIT (OUTPATIENT)
Dept: ORTHOPEDIC SURGERY | Age: 58
End: 2023-05-31

## 2023-05-31 DIAGNOSIS — M19.011 OSTEOARTHRITIS OF RIGHT SHOULDER, UNSPECIFIED OSTEOARTHRITIS TYPE: ICD-10-CM

## 2023-05-31 DIAGNOSIS — S46.011A TRAUMATIC ROTATOR CUFF TEAR, RIGHT, INITIAL ENCOUNTER: ICD-10-CM

## 2023-05-31 DIAGNOSIS — Z96.611 STATUS POST TOTAL SHOULDER ARTHROPLASTY, RIGHT: Primary | ICD-10-CM

## 2023-05-31 RX ORDER — CYCLOBENZAPRINE HCL 5 MG
5-10 TABLET ORAL NIGHTLY PRN
Qty: 30 TABLET | Refills: 0 | Status: CANCELLED | OUTPATIENT
Start: 2023-05-31 | End: 2023-06-15

## 2023-05-31 NOTE — PROGRESS NOTES
Name: Lindsay Vences  YOB: 1965  Gender: male  MRN: [id number]    CC:   Chief Complaint   Patient presents with    Shoulder Pain     W/C Right shoulder CT results      WORKERS COMP   S/P right TSA, biceps tenodesis DOS 8-27-21  HPI: Patient presents for FU of right shoulder for CT FU. He has been seen for bilateral shoulder, but right is the main focus at this time. Patient had exacerbation of symptoms in the right shoulder after a coworker tripped and lost control of a dresser and the patient tried to stop it from falling. He was having some numbness and tingling in the arm as well. CT with arthrogram was ordered in order to evaluate rotator cuff. We also discussed observing his deltoid and potential for nerve conduction study. At this time, he notes continued bilateral shoulder pain. Notes difficulty raising above his head. Does note popping and clicking. No Known Allergies  Past Medical History:   Diagnosis Date    Arthritis     GERD (gastroesophageal reflux disease)     History of      Past Surgical History:   Procedure Laterality Date    ORTHOPEDIC SURGERY  late 1990's    \"torn ligament\" \"reconstruction\" both knees    ROTATOR CUFF REPAIR Right 2020     Family History   Problem Relation Age of Onset    Heart Disease Mother      Social History     Socioeconomic History    Marital status:      Spouse name: Not on file    Number of children: Not on file    Years of education: Not on file    Highest education level: Not on file   Occupational History    Not on file   Tobacco Use    Smoking status: Never    Smokeless tobacco: Never   Substance and Sexual Activity    Alcohol use:  Yes     Alcohol/week: 16.0 standard drinks    Drug use: Yes     Types: Marijuana Kacie Hockey)    Sexual activity: Not on file   Other Topics Concern    Not on file   Social History Narrative    Not on file     Social Determinants of Health     Financial Resource Strain: Not on file   Food Insecurity: Not on file

## 2023-07-06 ENCOUNTER — TELEPHONE (OUTPATIENT)
Dept: ORTHOPEDIC SURGERY | Age: 58
End: 2023-07-06

## 2023-07-10 NOTE — TELEPHONE ENCOUNTER
Spoke with pt and wife and told them that W/C would have to request a new rating if they accept that his new injury was under W/C. If they would like a new rating we can set that up but that would need to be approved by them. They expressed understanding and thanked us for calling.

## 2023-08-15 ENCOUNTER — OFFICE VISIT (OUTPATIENT)
Dept: ORTHOPEDIC SURGERY | Age: 58
End: 2023-08-15

## 2023-08-15 DIAGNOSIS — Z96.611 STATUS POST TOTAL SHOULDER ARTHROPLASTY, RIGHT: Primary | ICD-10-CM

## 2023-08-15 DIAGNOSIS — S46.011A TRAUMATIC ROTATOR CUFF TEAR, RIGHT, INITIAL ENCOUNTER: ICD-10-CM

## 2023-08-15 DIAGNOSIS — M19.011 OSTEOARTHRITIS OF RIGHT SHOULDER, UNSPECIFIED OSTEOARTHRITIS TYPE: ICD-10-CM

## 2023-08-15 NOTE — PROGRESS NOTES
Name: Ibrahima Foreman  YOB: 1965  Gender: male  MRN: [id number]    CC:   Chief Complaint   Patient presents with    Follow-up     Right shoulder S/P right TSA, biceps tenodesis DOS 8-27-21      WORKERS COMP   S/P right TSA, biceps tenodesis DOS 8-27-21  HPI: Patient presents for follow-up of right shoulder pain status post right total shoulder arthroplasty with biceps tenodesis on 8-. Patient had exacerbation of symptoms in the right shoulder after a coworker tripped and lost control of the dresser and patient tried to stop it from falling. We previously obtained a CT arthrogram which showed insufficiency of the subscapularis. We discussed that surgical intervention would include a reverse total shoulder arthroplasty. At his last visit he was not hurting consistently enough to discuss proceeding with this. Today the patient states he has had no new changes or injuries. No Known Allergies  Past Medical History:   Diagnosis Date    Arthritis     GERD (gastroesophageal reflux disease)     History of      Past Surgical History:   Procedure Laterality Date    ORTHOPEDIC SURGERY  late 1990's    \"torn ligament\" \"reconstruction\" both knees    ROTATOR CUFF REPAIR Right 2020     Family History   Problem Relation Age of Onset    Heart Disease Mother      Social History     Socioeconomic History    Marital status:      Spouse name: Not on file    Number of children: Not on file    Years of education: Not on file    Highest education level: Not on file   Occupational History    Not on file   Tobacco Use    Smoking status: Never    Smokeless tobacco: Never   Substance and Sexual Activity    Alcohol use:  Yes     Alcohol/week: 16.0 standard drinks    Drug use: Yes     Types: Marijuana Deonte Mater)    Sexual activity: Not on file   Other Topics Concern    Not on file   Social History Narrative    Not on file     Social Determinants of Health     Financial Resource Strain: Not on file   Food

## 2024-04-23 ENCOUNTER — TELEPHONE (OUTPATIENT)
Dept: ORTHOPEDIC SURGERY | Age: 59
End: 2024-04-23

## 2024-04-23 NOTE — TELEPHONE ENCOUNTER
They came in Friday to get a copy of his note where he was released. He needs it for his employer. She was told someone would contact her. She has not heard from anyone. Please give her a call at [phone]. She says she does not see it on my chart.

## 2024-04-24 NOTE — TELEPHONE ENCOUNTER
His wife is calling again about this. I spoke with Marycarmen and she said he has not been released and that he would need to be seen again. When I spoke to his wife, she said he was released and the case was finalized in court so she doesn't understand. She said he could be seen again but that they don't have insurance and work comp isn't paying anymore since the case was closed. She would still like to speak to you to clarify everything. I did send her to appointments because she said she would go ahead and make the soonest appointment but that she still wants to speak to you about all of this.  Please call.

## 2024-04-24 NOTE — TELEPHONE ENCOUNTER
Spoke with Marianna as pt wife called back in today. I let her know that pt needs to be scheduled for a follow up as we have not released him and we were supposed to see him back and Feb and he did not come to his apt. Marianna said that she would let pt wife know as she was still on the phone when she called me.

## 2024-04-24 NOTE — TELEPHONE ENCOUNTER
Spoke with pt wife and she needs to just have a release for him to be able to drive. I told her that we can do that and put it in his chart. She expressed understanding and thanked me for calling.

## 2025-03-31 ENCOUNTER — APPOINTMENT (OUTPATIENT)
Dept: GENERAL RADIOLOGY | Age: 60
End: 2025-03-31
Payer: COMMERCIAL

## 2025-03-31 ENCOUNTER — HOSPITAL ENCOUNTER (EMERGENCY)
Age: 60
Discharge: HOME | End: 2025-03-31
Attending: EMERGENCY MEDICINE
Payer: COMMERCIAL

## 2025-03-31 VITALS
RESPIRATION RATE: 16 BRPM | SYSTOLIC BLOOD PRESSURE: 103 MMHG | OXYGEN SATURATION: 100 % | HEIGHT: 74 IN | TEMPERATURE: 98.8 F | HEART RATE: 60 BPM | WEIGHT: 219 LBS | BODY MASS INDEX: 28.11 KG/M2 | DIASTOLIC BLOOD PRESSURE: 67 MMHG

## 2025-03-31 DIAGNOSIS — S82.001A CLOSED DISPLACED FRACTURE OF RIGHT PATELLA, UNSPECIFIED FRACTURE MORPHOLOGY, INITIAL ENCOUNTER: ICD-10-CM

## 2025-03-31 DIAGNOSIS — S86.891A PATELLAR TENDON AVULSION, RIGHT, INITIAL ENCOUNTER: Primary | ICD-10-CM

## 2025-03-31 PROCEDURE — 6370000000 HC RX 637 (ALT 250 FOR IP): Performed by: EMERGENCY MEDICINE

## 2025-03-31 PROCEDURE — 73562 X-RAY EXAM OF KNEE 3: CPT

## 2025-03-31 PROCEDURE — 99283 EMERGENCY DEPT VISIT LOW MDM: CPT

## 2025-03-31 RX ORDER — HYDROCODONE BITARTRATE AND ACETAMINOPHEN 10; 325 MG/1; MG/1
1 TABLET ORAL EVERY 6 HOURS PRN
Qty: 15 TABLET | Refills: 0 | Status: SHIPPED | OUTPATIENT
Start: 2025-03-31 | End: 2025-04-05

## 2025-03-31 RX ORDER — HYDROCODONE BITARTRATE AND ACETAMINOPHEN 10; 325 MG/1; MG/1
1 TABLET ORAL
Refills: 0 | Status: COMPLETED | OUTPATIENT
Start: 2025-03-31 | End: 2025-03-31

## 2025-03-31 RX ADMIN — HYDROCODONE BITARTRATE AND ACETAMINOPHEN 1 TABLET: 10; 325 TABLET ORAL at 18:55

## 2025-03-31 ASSESSMENT — ENCOUNTER SYMPTOMS
SHORTNESS OF BREATH: 0
BACK PAIN: 0
COLOR CHANGE: 0
COUGH: 0

## 2025-03-31 ASSESSMENT — PAIN DESCRIPTION - ORIENTATION: ORIENTATION: RIGHT

## 2025-03-31 ASSESSMENT — PAIN DESCRIPTION - PAIN TYPE: TYPE: ACUTE PAIN

## 2025-03-31 ASSESSMENT — PAIN - FUNCTIONAL ASSESSMENT
PAIN_FUNCTIONAL_ASSESSMENT: PREVENTS OR INTERFERES SOME ACTIVE ACTIVITIES AND ADLS
PAIN_FUNCTIONAL_ASSESSMENT: 0-10

## 2025-03-31 ASSESSMENT — LIFESTYLE VARIABLES
HOW MANY STANDARD DRINKS CONTAINING ALCOHOL DO YOU HAVE ON A TYPICAL DAY: PATIENT DOES NOT DRINK
HOW OFTEN DO YOU HAVE A DRINK CONTAINING ALCOHOL: NEVER

## 2025-03-31 ASSESSMENT — PAIN DESCRIPTION - DESCRIPTORS: DESCRIPTORS: THROBBING

## 2025-03-31 ASSESSMENT — PAIN SCALES - GENERAL
PAINLEVEL_OUTOF10: 10
PAINLEVEL_OUTOF10: 10

## 2025-03-31 ASSESSMENT — PAIN DESCRIPTION - LOCATION: LOCATION: LEG

## 2025-03-31 NOTE — ED TRIAGE NOTES
Pt c/o R knee pain. Recently fractured patella while out of town. Pt back home now and req referral to ortho and additional pain control.

## 2025-03-31 NOTE — ED PROVIDER NOTES
Emergency Department Provider Note       PCP: Unknown, Provider, ANP   Age: 59 y.o.   Sex: male     DISPOSITION Decision To Discharge 03/31/2025 06:46:12 PM    ICD-10-CM    1. Patellar tendon avulsion, right, initial encounter  S86.891A [facility]     CANCELED: [facility]      2. Closed displaced fracture of right patella, unspecified fracture morphology, initial encounter  S82.001A HYDROcodone-acetaminophen (NORCO)  MG per tablet          Medical Decision Making     Patient with a patellar fracture and avulsion of the patellar tendon.  Occurred a week ago in [place].  Here for repeat evaluation and orthopedic referral.  Will treat his pain and discharge with [facility] follow-up     1 or more acute illnesses that pose a threat to life or bodily function.   Prescription drug management performed.  Parental controlled substances given in the ED.  Patient was discharged risks and benefits of hospitalization were considered.  Shared medical decision making was utilized in creating the patients health plan today.  I independently ordered and reviewed each unique test.       The patients assessment required an independent historian: wife.  The reason they were needed is important historical information not provided by the patient.  ED cardiac monitoring rhythm strip was ordered and interpreted:  sinus rhythm, no evidence of an arrhythmia  ST Segments:Nonspecific ST segments - NO STEMI   Rate: 89  I interpreted the X-rays patellar fracture.              History     Patient with a right patellar fracture after a fall treated in [place] March 24.  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  Here now and currently out of pain medication.  He is using crutches and in a knee immobilizer.    The history is provided by the patient and the spouse. No  was

## 2025-04-07 ENCOUNTER — OFFICE VISIT (OUTPATIENT)
Dept: ORTHOPEDIC SURGERY | Age: 60
End: 2025-04-07
Payer: COMMERCIAL

## 2025-04-07 DIAGNOSIS — S86.891A PATELLAR TENDON AVULSION, RIGHT, INITIAL ENCOUNTER: Primary | ICD-10-CM

## 2025-04-07 DIAGNOSIS — S82.091A OTHER CLOSED FRACTURE OF RIGHT PATELLA, INITIAL ENCOUNTER: ICD-10-CM

## 2025-04-07 PROCEDURE — 99205 OFFICE O/P NEW HI 60 MIN: CPT | Performed by: ORTHOPAEDIC SURGERY

## 2025-04-07 NOTE — H&P (VIEW-ONLY)
Orthopaedic Trauma Clinic Note    Name: Dash Mohan  YOB: 1965  Gender: male  MRN: [id number]  PCP:    CC: Right knee pain    History of Present Illness    Dash Mohan is a 59-year-old male who works as a long-.  The patient presents for evaluation of right knee pain.    An injury to the right knee was sustained on 03/24/2025, approximately 2 weeks ago, while attempting to move pads to the front of a truck. The incident occurred due to a water leak from the top of the trailer, causing a slip and fall, resulting in the knee striking a wooden floor. No pre-existing knee pain was reported prior to this injury. Pain management has included Norco 10 mg, with 2 tablets taken yesterday, but the medication has not been effective. Another fall occurred yesterday, exacerbating the pain. Work involves long-haul , but these tasks are currently unmanageable due to the injury.    A history of similar injuries includes surgery on both knees following basketball-related injuries. The right knee was previously treated with a ligament repair procedure approximately 35 years ago when he was in his 20s for what appears to be a similar problem of patellar tendon avulsion.    Patient lives with his wife here in [place].    ROS/Meds/PSH/PMH/FH/SH:  Pertinent details discussed in HPI    Physical Examination:  General:  Awake and conversive, no distress, well nourished, age-appropriate.  Neurological: A&O x 3, normal coordination and tone.  Psych: Normal mood, affect and behavior. Normal thought content and judgment. Cooperative w/ exam.  Cardiovascular: RRR, + palpable pulses b/l upper extremity  Pulmonary: Chest excursion equal bilaterally, breathing non-labored    Musculoskeletal Exam:  Right lower extremity  - Skin: Long anterior scar.  Does have posterior medial scar from what I suspect was hamstring tendon harvest  - Normal alignment, does have significant global

## 2025-04-07 NOTE — PROGRESS NOTES
prescription was sent to [facility] on [address].    After reviewing risks and benefits of surgery, patient elects to proceed with surgery on Tuesday, 4/22/2025.    Follow-up:  The earliest available surgery date is Thursday this week, with alternative options for Tuesday next week or the following week.  After reviewing risks and benefits of surgery, patient elects to proceed with surgery on Tuesday, 4/22/2025.    Right revision patellar tendon repair, 1.5 hours  - Supine, regular table, [employer] rep aware    PROCEDURE  A hinged knee brace was applied today to provide support to the knee and protect repair during healing      Trell Garrett MD  Orthopaedic Surgery  04/07/25    I reviewed the patient's medical record to include notes from previous encounter's to obtain an accurate understanding of the patient's health. I personally performed the HPI, exam, and assessment/plan, verified the documentation, approve that it is updated, accurate, and complete. Patient demonstrated understanding of information reviewed and agreement with treatment plan. All questions were answered to the patient's satisfaction.    The patient (or guardian, if applicable) and other individuals in attendance with the patient were advised that Artificial Intelligence will be utilized during this visit to record, process the conversation to generate a clinical note, and support improvement of the AI technology. The patient (or guardian, if applicable) and other individuals in attendance at the appointment consented to the use of AI, including the recording.

## 2025-04-08 ENCOUNTER — TELEPHONE (OUTPATIENT)
Dept: ORTHOPEDIC SURGERY | Age: 60
End: 2025-04-08

## 2025-04-08 PROBLEM — S86.891A: Status: ACTIVE | Noted: 2025-04-07

## 2025-04-08 RX ORDER — HYDROCODONE BITARTRATE AND ACETAMINOPHEN 10; 325 MG/1; MG/1
1 TABLET ORAL EVERY 8 HOURS PRN
Qty: 90 TABLET | Refills: 0 | Status: SHIPPED | OUTPATIENT
Start: 2025-04-08 | End: 2025-05-08

## 2025-04-08 NOTE — TELEPHONE ENCOUNTER
Pt's wife called about Rx that was to be sent in for pt. Pt was seen yesterday, 4/7. Wife stated that a two prescriptions were to be sent in. One was oxy and she can not remember the other one. Pt was to alternate these two Rxs with Tylenol. Pharmacy is [facility] on [address].

## 2025-04-08 NOTE — ADDENDUM NOTE
Addended by: YOLANDA HAMILTON on: 4/8/2025 01:37 PM     Modules accepted: Orders     Orders faxed to [facility]

## 2025-04-15 NOTE — PERIOP NOTE
Patient verified name and .  Order for consent  was not found in EHR and does not match case posting; patient verifies procedure.   Type 1B surgery, PHONE assessment complete.  Orders NOT received.  Labs per surgeon:NONE  Labs per anesthesia protocol: NONE    Patient answered medical/surgical history questions at their best of ability. All prior to admission medications documented in EPIC.    Patient instructed to continue taking all prescription medications up to the day of surgery but to take only the following medications the day of surgery according to anesthesia guidelines with a small sip of water:     Hydrocodone-acetaminophen (Norco) if needed        Also, patient is requested to take 2 Tylenol in the morning and then again before bed on the day before surgery. Regular or extra strength may be used.       Patient informed that all vitamins and supplements should be held 7 days prior to surgery and NSAIDS 5 days prior to surgery. Prescription meds to hold: NONE    Patient instructed on the following:    > Arrive at MAIN Entrance, time of arrival to be called the day before by 1700  > No food after midnight, patient may drink clear liquids up until 2 hours prior to arrival. No gum, candy, mints.   > Responsible adult must drive patient to the hospital, stay during surgery, and patient will need supervision 24 hours after anesthesia  > Use non moisturizing soap in shower the night before surgery and on the morning of surgery  > All piercings must be removed prior to arrival.    > Leave all valuables (money and jewelry) at home but bring insurance card and ID on DOS.   > You may be required to pay a deductible or co-pay on the day of your procedure. You can pre-pay by calling [phone] if your surgery is at the [facility] or [phone] if your surgery is at the [facility].  > Do not wear make-up, nail polish, lotions, cologne, perfumes, powders, or oil on skin. Artificial nails are not permitted.

## 2025-04-22 ENCOUNTER — ANESTHESIA EVENT (OUTPATIENT)
Dept: SURGERY | Age: 60
End: 2025-04-22
Payer: COMMERCIAL

## 2025-04-22 ENCOUNTER — ANESTHESIA (OUTPATIENT)
Dept: SURGERY | Age: 60
End: 2025-04-22
Payer: COMMERCIAL

## 2025-04-22 ENCOUNTER — HOSPITAL ENCOUNTER (OUTPATIENT)
Age: 60
Setting detail: OUTPATIENT SURGERY
Discharge: HOME | End: 2025-04-22
Attending: ORTHOPAEDIC SURGERY | Admitting: ORTHOPAEDIC SURGERY
Payer: COMMERCIAL

## 2025-04-22 VITALS
WEIGHT: 220 LBS | SYSTOLIC BLOOD PRESSURE: 133 MMHG | HEIGHT: 74 IN | OXYGEN SATURATION: 97 % | TEMPERATURE: 97.5 F | RESPIRATION RATE: 17 BRPM | BODY MASS INDEX: 28.23 KG/M2 | DIASTOLIC BLOOD PRESSURE: 71 MMHG | HEART RATE: 59 BPM

## 2025-04-22 DIAGNOSIS — S86.891A PATELLAR TENDON AVULSION, RIGHT, INITIAL ENCOUNTER: Primary | ICD-10-CM

## 2025-04-22 PROCEDURE — 6360000002 HC RX W HCPCS: Performed by: ORTHOPAEDIC SURGERY

## 2025-04-22 PROCEDURE — 3600000004 HC SURGERY LEVEL 4 BASE: Performed by: ORTHOPAEDIC SURGERY

## 2025-04-22 PROCEDURE — 2580000003 HC RX 258: Performed by: ANESTHESIOLOGY

## 2025-04-22 PROCEDURE — 2500000003 HC RX 250 WO HCPCS: Performed by: ORTHOPAEDIC SURGERY

## 2025-04-22 PROCEDURE — 3700000000 HC ANESTHESIA ATTENDED CARE: Performed by: ORTHOPAEDIC SURGERY

## 2025-04-22 PROCEDURE — 6360000002 HC RX W HCPCS: Performed by: ANESTHESIOLOGY

## 2025-04-22 PROCEDURE — 3600000014 HC SURGERY LEVEL 4 ADDTL 15MIN: Performed by: ORTHOPAEDIC SURGERY

## 2025-04-22 PROCEDURE — 6360000002 HC RX W HCPCS: Performed by: NURSE ANESTHETIST, CERTIFIED REGISTERED

## 2025-04-22 PROCEDURE — 3700000001 HC ADD 15 MINUTES (ANESTHESIA): Performed by: ORTHOPAEDIC SURGERY

## 2025-04-22 PROCEDURE — 7100000000 HC PACU RECOVERY - FIRST 15 MIN: Performed by: ORTHOPAEDIC SURGERY

## 2025-04-22 PROCEDURE — 7100000001 HC PACU RECOVERY - ADDTL 15 MIN: Performed by: ORTHOPAEDIC SURGERY

## 2025-04-22 PROCEDURE — 6370000000 HC RX 637 (ALT 250 FOR IP): Performed by: ANESTHESIOLOGY

## 2025-04-22 PROCEDURE — 7100000010 HC PHASE II RECOVERY - FIRST 15 MIN: Performed by: ORTHOPAEDIC SURGERY

## 2025-04-22 PROCEDURE — 2500000003 HC RX 250 WO HCPCS: Performed by: NURSE ANESTHETIST, CERTIFIED REGISTERED

## 2025-04-22 PROCEDURE — C1769 GUIDE WIRE: HCPCS | Performed by: ORTHOPAEDIC SURGERY

## 2025-04-22 PROCEDURE — 2709999900 HC NON-CHARGEABLE SUPPLY: Performed by: ORTHOPAEDIC SURGERY

## 2025-04-22 RX ORDER — OXYCODONE HYDROCHLORIDE 5 MG/1
5 TABLET ORAL
Status: COMPLETED | OUTPATIENT
Start: 2025-04-22 | End: 2025-04-22

## 2025-04-22 RX ORDER — OXYCODONE HYDROCHLORIDE 5 MG/1
5 TABLET ORAL EVERY 6 HOURS PRN
Qty: 28 TABLET | Refills: 0 | Status: SHIPPED | OUTPATIENT
Start: 2025-04-22 | End: 2025-04-29

## 2025-04-22 RX ORDER — PROPOFOL 10 MG/ML
INJECTION, EMULSION INTRAVENOUS
Status: DISCONTINUED | OUTPATIENT
Start: 2025-04-22 | End: 2025-04-22 | Stop reason: SDUPTHER

## 2025-04-22 RX ORDER — SODIUM CHLORIDE, SODIUM LACTATE, POTASSIUM CHLORIDE, CALCIUM CHLORIDE 600; 310; 30; 20 MG/100ML; MG/100ML; MG/100ML; MG/100ML
INJECTION, SOLUTION INTRAVENOUS CONTINUOUS
Status: DISCONTINUED | OUTPATIENT
Start: 2025-04-22 | End: 2025-04-22 | Stop reason: HOSPADM

## 2025-04-22 RX ORDER — ASPIRIN 325 MG
325 TABLET ORAL DAILY
Qty: 30 TABLET | Refills: 0 | Status: SHIPPED | OUTPATIENT
Start: 2025-04-22

## 2025-04-22 RX ORDER — ONDANSETRON 4 MG/1
4 TABLET, FILM COATED ORAL 3 TIMES DAILY PRN
Qty: 15 TABLET | Refills: 0 | Status: SHIPPED | OUTPATIENT
Start: 2025-04-22

## 2025-04-22 RX ORDER — ONDANSETRON 2 MG/ML
4 INJECTION INTRAMUSCULAR; INTRAVENOUS
Status: DISCONTINUED | OUTPATIENT
Start: 2025-04-22 | End: 2025-04-22 | Stop reason: HOSPADM

## 2025-04-22 RX ORDER — ONDANSETRON 2 MG/ML
INJECTION INTRAMUSCULAR; INTRAVENOUS
Status: DISCONTINUED | OUTPATIENT
Start: 2025-04-22 | End: 2025-04-22 | Stop reason: SDUPTHER

## 2025-04-22 RX ORDER — DOCUSATE SODIUM 100 MG/1
100 CAPSULE, LIQUID FILLED ORAL 2 TIMES DAILY
Qty: 60 CAPSULE | Refills: 0 | Status: SHIPPED | OUTPATIENT
Start: 2025-04-22 | End: 2025-05-22

## 2025-04-22 RX ORDER — LIDOCAINE HYDROCHLORIDE 10 MG/ML
1 INJECTION, SOLUTION INFILTRATION; PERINEURAL
Status: DISCONTINUED | OUTPATIENT
Start: 2025-04-22 | End: 2025-04-22 | Stop reason: HOSPADM

## 2025-04-22 RX ORDER — EPHEDRINE SULFATE 5 MG/ML
INJECTION INTRAVENOUS
Status: DISCONTINUED | OUTPATIENT
Start: 2025-04-22 | End: 2025-04-22 | Stop reason: SDUPTHER

## 2025-04-22 RX ORDER — KETAMINE HCL IN NACL, ISO-OSM 20 MG/2 ML
SYRINGE (ML) INJECTION
Status: DISCONTINUED | OUTPATIENT
Start: 2025-04-22 | End: 2025-04-22 | Stop reason: SDUPTHER

## 2025-04-22 RX ORDER — LIDOCAINE HYDROCHLORIDE 20 MG/ML
INJECTION, SOLUTION EPIDURAL; INFILTRATION; INTRACAUDAL; PERINEURAL
Status: DISCONTINUED | OUTPATIENT
Start: 2025-04-22 | End: 2025-04-22 | Stop reason: SDUPTHER

## 2025-04-22 RX ORDER — MIDAZOLAM HYDROCHLORIDE 1 MG/ML
INJECTION, SOLUTION INTRAMUSCULAR; INTRAVENOUS
Status: DISCONTINUED | OUTPATIENT
Start: 2025-04-22 | End: 2025-04-22 | Stop reason: SDUPTHER

## 2025-04-22 RX ORDER — BUPIVACAINE HYDROCHLORIDE AND EPINEPHRINE 5; 5 MG/ML; UG/ML
INJECTION, SOLUTION EPIDURAL; INTRACAUDAL; PERINEURAL PRN
Status: DISCONTINUED | OUTPATIENT
Start: 2025-04-22 | End: 2025-04-22 | Stop reason: ALTCHOICE

## 2025-04-22 RX ORDER — NALOXONE HYDROCHLORIDE 0.4 MG/ML
INJECTION, SOLUTION INTRAMUSCULAR; INTRAVENOUS; SUBCUTANEOUS PRN
Status: DISCONTINUED | OUTPATIENT
Start: 2025-04-22 | End: 2025-04-22 | Stop reason: HOSPADM

## 2025-04-22 RX ORDER — DEXAMETHASONE SODIUM PHOSPHATE 4 MG/ML
INJECTION, SOLUTION INTRA-ARTICULAR; INTRALESIONAL; INTRAMUSCULAR; INTRAVENOUS; SOFT TISSUE
Status: DISCONTINUED | OUTPATIENT
Start: 2025-04-22 | End: 2025-04-22 | Stop reason: SDUPTHER

## 2025-04-22 RX ADMIN — HYDROMORPHONE HYDROCHLORIDE 0.2 MG: 0.5 INJECTION, SOLUTION INTRAMUSCULAR; INTRAVENOUS; SUBCUTANEOUS at 15:23

## 2025-04-22 RX ADMIN — EPHEDRINE SULFATE 10 MG: 5 INJECTION INTRAVENOUS at 15:00

## 2025-04-22 RX ADMIN — DEXAMETHASONE SODIUM PHOSPHATE 4 MG: 4 INJECTION INTRA-ARTICULAR; INTRALESIONAL; INTRAMUSCULAR; INTRAVENOUS; SOFT TISSUE at 14:10

## 2025-04-22 RX ADMIN — HYDROMORPHONE HYDROCHLORIDE 0.5 MG: 1 INJECTION, SOLUTION INTRAMUSCULAR; INTRAVENOUS; SUBCUTANEOUS at 17:58

## 2025-04-22 RX ADMIN — HYDROMORPHONE HYDROCHLORIDE 0.4 MG: 0.5 INJECTION, SOLUTION INTRAMUSCULAR; INTRAVENOUS; SUBCUTANEOUS at 14:27

## 2025-04-22 RX ADMIN — HYDROMORPHONE HYDROCHLORIDE 0.4 MG: 0.5 INJECTION, SOLUTION INTRAMUSCULAR; INTRAVENOUS; SUBCUTANEOUS at 14:50

## 2025-04-22 RX ADMIN — EPHEDRINE SULFATE 5 MG: 5 INJECTION INTRAVENOUS at 14:36

## 2025-04-22 RX ADMIN — EPHEDRINE SULFATE 5 MG: 5 INJECTION INTRAVENOUS at 14:45

## 2025-04-22 RX ADMIN — EPHEDRINE SULFATE 10 MG: 5 INJECTION INTRAVENOUS at 15:38

## 2025-04-22 RX ADMIN — ONDANSETRON 4 MG: 2 INJECTION INTRAMUSCULAR; INTRAVENOUS at 14:10

## 2025-04-22 RX ADMIN — HYDROMORPHONE HYDROCHLORIDE 0.6 MG: 0.5 INJECTION, SOLUTION INTRAMUSCULAR; INTRAVENOUS; SUBCUTANEOUS at 14:20

## 2025-04-22 RX ADMIN — HYDROMORPHONE HYDROCHLORIDE 0.2 MG: 0.5 INJECTION, SOLUTION INTRAMUSCULAR; INTRAVENOUS; SUBCUTANEOUS at 15:56

## 2025-04-22 RX ADMIN — EPHEDRINE SULFATE 5 MG: 5 INJECTION INTRAVENOUS at 15:31

## 2025-04-22 RX ADMIN — MIDAZOLAM 2 MG: 1 INJECTION INTRAMUSCULAR; INTRAVENOUS at 13:58

## 2025-04-22 RX ADMIN — SODIUM CHLORIDE, SODIUM LACTATE, POTASSIUM CHLORIDE, AND CALCIUM CHLORIDE: .6; .31; .03; .02 INJECTION, SOLUTION INTRAVENOUS at 12:17

## 2025-04-22 RX ADMIN — HYDROMORPHONE HYDROCHLORIDE 0.2 MG: 0.5 INJECTION, SOLUTION INTRAMUSCULAR; INTRAVENOUS; SUBCUTANEOUS at 15:18

## 2025-04-22 RX ADMIN — PROPOFOL 300 MG: 10 INJECTION, EMULSION INTRAVENOUS at 14:02

## 2025-04-22 RX ADMIN — Medication 20 MG: at 14:09

## 2025-04-22 RX ADMIN — LIDOCAINE HYDROCHLORIDE 100 MG: 20 INJECTION, SOLUTION EPIDURAL; INFILTRATION; INTRACAUDAL; PERINEURAL at 14:02

## 2025-04-22 RX ADMIN — Medication 2000 MG: at 14:10

## 2025-04-22 RX ADMIN — OXYCODONE 5 MG: 5 TABLET ORAL at 17:26

## 2025-04-22 ASSESSMENT — PAIN DESCRIPTION - DESCRIPTORS
DESCRIPTORS: SORE
DESCRIPTORS: ACHING;SORE
DESCRIPTORS: SORE

## 2025-04-22 ASSESSMENT — PAIN SCALES - GENERAL
PAINLEVEL_OUTOF10: 5
PAINLEVEL_OUTOF10: 8

## 2025-04-22 ASSESSMENT — PAIN DESCRIPTION - ORIENTATION
ORIENTATION: RIGHT;ANTERIOR
ORIENTATION: RIGHT;ANTERIOR

## 2025-04-22 ASSESSMENT — PAIN - FUNCTIONAL ASSESSMENT
PAIN_FUNCTIONAL_ASSESSMENT: 0-10
PAIN_FUNCTIONAL_ASSESSMENT: 0-10

## 2025-04-22 ASSESSMENT — PAIN DESCRIPTION - LOCATION
LOCATION: KNEE
LOCATION: KNEE

## 2025-04-22 NOTE — DISCHARGE INSTRUCTIONS
drink alcoholic beverages  If you have not urinated within 8 hours after discharge, and you are experiencing discomfort from urinary retention, please go to the nearest ED.  If you have sleep apnea and have a CPAP machine, please use it for all naps and sleeping.  Please use caution when taking narcotics and any of your home medications that may cause drowsiness.    Please give a list of your current medications to your Primary Care Provider.  Please update this list whenever your medications are discontinued, doses are changed, or new medications (including over-the-counter products) are added.  Please carry medication information at all times in case of emergency situations.    Follow-up with [facility] on 5/7/2025.  Please call clinic ([phone]) to confirm appointment.      After hours (Nights/Weekends) if you have any of the following problems call [facility]  at [phone] and ask for Orthopedic Provider on Call  Excessive bleeding that does not stop after holding pressure over the area  Temperature of 101 degrees F or above  Excessive redness, swelling or bruising, and/ or green or yellow, smelly discharge from incision    Trell Garrett MD   Orthopaedic Trauma Surgeon   [place] [address], [address]  [address] [facility]: [phone]  [facility]: [phone]

## 2025-04-22 NOTE — OP NOTE
Operative Note      Patient: Dash Mohan  YOB: 1965  MRN: [id number]    Date of Procedure: 4/22/2025    Pre-Op Diagnosis Codes:      * Patellar tendon avulsion, right, initial encounter for recurrent injury [S86.891A]    Post-Op Diagnosis: Same       Procedure(s):  - Left suture of infrapatellar tendon; secondary reconstruction (CPT 10391)    Surgeon(s):  Trell Garrett MD    Anesthesia: Choice    Estimated Blood Loss (mL): less than 100     Complications: None    Implants:  - Arthrex 1.3 mm suture tape x 2  - Arthrex 2 mm fibertape    Findings:  Infection Present At Time Of Surgery (PATOS) (choose all levels that have infection present):  No infection present    Other Findings:   Right patellar tendon avulsion from the inferior pole patella status post prior (remote) reconstruction     Indications:  59M long- status post mechanical fall onto his right knee on 3/20/2025 w    Patient has been surgical history significant for remote right patellar tendon reconstruction    Pre-operative exam was notable for intact distal neurovascular function.     Surgery is recommended with the goal(s) of:  - Attain and maintain extremity alignment and allow early range of motion to restore function     Risks/Benefits:  The risks, benefits, and alternatives of the procedure were discussed with patient in detail. Surgical and non-surgical treatment options were discussed with the patient and their family, as well as the risk and benefits of each option. Together, we decided to proceed with surgical management. General risks of surgery include bleeding, infection, damage to neurovascular structures, deep venous thrombosis, pulmonary or cerebral embolism, death, stroke, or other unforseen catastrophe. Specific risks for this procedure include persistent pain, numbness or weakness, wound healing complications, deep infection, joint stiffness (limited range of motion), or need for further surgery. The

## 2025-04-22 NOTE — PERIOP NOTE
At 1726 patient complaining of 5/10 pain in the R knee. 5mg oxycodone given to pt, see eMAR for med admin. At 1758 patient complaining of 8/10 pain in R knee. Dr. Camargo called to bedside to assess pt. Per MD, medicate pt with IV Dilaudid per MAR instructions to patient satisfaction.

## 2025-04-22 NOTE — ANESTHESIA PRE PROCEDURE
encounter S86.891A       Past Medical History:        Diagnosis Date   • Arthritis    • Dysphagia    • GERD (gastroesophageal reflux disease)     History of        Past Surgical History:        Procedure Laterality Date   • ORTHOPEDIC SURGERY  late 1990's    \"torn ligament\" \"reconstruction\" both knees   • ROTATOR CUFF REPAIR Right 2020       Social History:    Social History     Tobacco Use   • Smoking status: Never   • Smokeless tobacco: Never   Substance Use Topics   • Alcohol use: Yes     Alcohol/week: 16.0 standard drinks of alcohol     Comment: occasionally                                Counseling given: Not Answered      Vital Signs (Current):   Vitals:    04/15/25 1428 04/22/25 1207   BP:  122/74   Pulse:  54   Resp:  16   Temp:  98.3 °F (36.8 °C)   TempSrc:  Oral   SpO2:  98%   Weight: 99.8 kg (220 lb) 99.8 kg (220 lb)   Height: 1.88 m (6' 2\") 1.88 m (6' 2\")                                              BP Readings from Last 3 Encounters:   04/22/25 122/74   03/31/25 103/67   05/02/23 114/70       NPO Status: Time of last liquid consumption: 2200                        Time of last solid consumption: 2200                        Date of last liquid consumption: 04/21/25                        Date of last solid food consumption: 04/21/25    BMI:   Wt Readings from Last 3 Encounters:   04/22/25 99.8 kg (220 lb)   03/31/25 99.3 kg (219 lb)   11/28/22 105.2 kg (232 lb)     Body mass index is 28.25 kg/m².    CBC:   Lab Results   Component Value Date/Time    WBC 15.8 11/28/2022 02:17 PM    RBC 5.42 11/28/2022 02:17 PM    HGB 16.4 11/28/2022 02:17 PM    HCT 50.2 11/28/2022 02:17 PM    MCV 92.6 11/28/2022 02:17 PM    RDW 13.8 11/28/2022 02:17 PM     11/28/2022 02:17 PM       CMP:   Lab Results   Component Value Date/Time     11/28/2022 02:17 PM    K 3.5 11/28/2022 02:17 PM     11/28/2022 02:17 PM    CO2 25 11/28/2022 02:17 PM    BUN 9 11/28/2022 02:17 PM    CREATININE 1.10 11/28/2022 02:17 PM

## 2025-04-22 NOTE — INTERVAL H&P NOTE
Update History & Physical    History and Physical was reviewed and I examined the patient.   There was no change. The surgical site was confirmed by the patient and me.     Procedure: Right knee revision patellar tendon repair    Plan: The risks, benefits, expected outcome, and alternative to the recommended procedure have been discussed with the patient per op note. Patient and/or family understands and wants to proceed.

## 2025-04-22 NOTE — PERIOP NOTE
1840 reassess pt pain. Per pt, pain better after IV dilaudid and PO oxycodone. Per pt, declines additional pain medicine at this time and would like to discharge home. MD Mary Jo at bedside and ok with this plan.

## 2025-04-22 NOTE — ANESTHESIA POSTPROCEDURE EVALUATION
Department of Anesthesiology  Postprocedure Note    Patient: Dash Mohan  MRN: [id number]  YOB: 1965  Date of evaluation: 4/22/2025    Procedure Summary       Date: 04/22/25 Room / Location: [facility] MAIN OR 08 / [facility] MAIN OR    Anesthesia Start: 1350 Anesthesia Stop: 1619    Procedure: PATELLA TENDON REPAIR (Right: Knee) Diagnosis:       Patellar tendon avulsion, right, initial encounter      (Patellar tendon avulsion, right, initial encounter [S86.891A])    Providers: Trell Garrett MD Responsible Provider: Edu Camargo MD    Anesthesia Type: general ASA Status: 2            Anesthesia Type: No value filed.    Queta Phase I: Queta Score: 4    Queta Phase II:      Anesthesia Post Evaluation    Patient location during evaluation: PACU  Patient participation: complete - patient participated  Level of consciousness: sleepy but conscious  Airway patency: patent  Nausea & Vomiting: no nausea and no vomiting  Cardiovascular status: hemodynamically stable  Respiratory status: acceptable, nonlabored ventilation and spontaneous ventilation  Hydration status: euvolemic  Comments: /77   Pulse 58   Temp 98.1 °F (36.7 °C) (Temporal)   Resp 18   Ht 1.88 m (6' 2\")   Wt 99.8 kg (220 lb)   SpO2 96%   BMI 28.25 kg/m²     Multimodal analgesia pain management approach  Pain management: adequate and satisfactory to patient    No notable events documented.

## 2025-04-27 ENCOUNTER — HOSPITAL ENCOUNTER (EMERGENCY)
Age: 60
Discharge: HOME | End: 2025-04-27
Payer: COMMERCIAL

## 2025-04-27 VITALS
HEART RATE: 63 BPM | RESPIRATION RATE: 19 BRPM | HEIGHT: 74 IN | OXYGEN SATURATION: 97 % | SYSTOLIC BLOOD PRESSURE: 148 MMHG | DIASTOLIC BLOOD PRESSURE: 98 MMHG | WEIGHT: 225 LBS | TEMPERATURE: 98.8 F | BODY MASS INDEX: 28.88 KG/M2

## 2025-04-27 DIAGNOSIS — R11.2 NAUSEA AND VOMITING, UNSPECIFIED VOMITING TYPE: Primary | ICD-10-CM

## 2025-04-27 LAB
ALBUMIN SERPL-MCNC: 3.8 G/DL (ref 3.5–5)
ALBUMIN/GLOB SERPL: 0.8 (ref 1–1.9)
ALP SERPL-CCNC: 103 U/L (ref 40–129)
ALT SERPL-CCNC: 25 U/L (ref 8–55)
ANION GAP SERPL CALC-SCNC: 16 MMOL/L (ref 7–16)
AST SERPL-CCNC: 23 U/L (ref 15–37)
BASOPHILS # BLD: 0.11 K/UL (ref 0–0.2)
BASOPHILS NFR BLD: 1.2 % (ref 0–2)
BILIRUB SERPL-MCNC: 0.9 MG/DL (ref 0–1.2)
BUN SERPL-MCNC: 19 MG/DL (ref 6–23)
CALCIUM SERPL-MCNC: 10.4 MG/DL (ref 8.8–10.2)
CHLORIDE SERPL-SCNC: 95 MMOL/L (ref 98–107)
CO2 SERPL-SCNC: 26 MMOL/L (ref 20–29)
CREAT SERPL-MCNC: 1.27 MG/DL (ref 0.8–1.3)
DIFFERENTIAL METHOD BLD: NORMAL
EOSINOPHIL # BLD: 0.1 K/UL (ref 0–0.8)
EOSINOPHIL NFR BLD: 1.1 % (ref 0.5–7.8)
ERYTHROCYTE [DISTWIDTH] IN BLOOD BY AUTOMATED COUNT: 13.2 % (ref 11.9–14.6)
GLOBULIN SER CALC-MCNC: 5 G/DL (ref 2.3–3.5)
GLUCOSE SERPL-MCNC: 118 MG/DL (ref 70–99)
HCT VFR BLD AUTO: 48 % (ref 41.1–50.3)
HGB BLD-MCNC: 16.4 G/DL (ref 13.6–17.2)
IMM GRANULOCYTES # BLD AUTO: 0.03 K/UL (ref 0–0.5)
IMM GRANULOCYTES NFR BLD AUTO: 0.3 % (ref 0–5)
LIPASE SERPL-CCNC: 41 U/L (ref 13–60)
LYMPHOCYTES # BLD: 2.7 K/UL (ref 0.5–4.6)
LYMPHOCYTES NFR BLD: 29.3 % (ref 13–44)
MCH RBC QN AUTO: 29.5 PG (ref 26.1–32.9)
MCHC RBC AUTO-ENTMCNC: 34.2 G/DL (ref 31.4–35)
MCV RBC AUTO: 86.5 FL (ref 82–102)
MONOCYTES # BLD: 0.95 K/UL (ref 0.1–1.3)
MONOCYTES NFR BLD: 10.3 % (ref 4–12)
NEUTS SEG # BLD: 5.33 K/UL (ref 1.7–8.2)
NEUTS SEG NFR BLD: 57.8 % (ref 43–78)
NRBC # BLD: 0 K/UL (ref 0–0.2)
PLATELET # BLD AUTO: 419 K/UL (ref 150–450)
PMV BLD AUTO: 9.8 FL (ref 9.4–12.3)
POTASSIUM SERPL-SCNC: 3.6 MMOL/L (ref 3.5–5.1)
PROT SERPL-MCNC: 8.8 G/DL (ref 6.3–8.2)
RBC # BLD AUTO: 5.55 M/UL (ref 4.23–5.6)
SODIUM SERPL-SCNC: 137 MMOL/L (ref 136–145)
WBC # BLD AUTO: 9.2 K/UL (ref 4.3–11.1)

## 2025-04-27 PROCEDURE — 96361 HYDRATE IV INFUSION ADD-ON: CPT

## 2025-04-27 PROCEDURE — 6360000002 HC RX W HCPCS: Performed by: PHYSICIAN ASSISTANT

## 2025-04-27 PROCEDURE — 99284 EMERGENCY DEPT VISIT MOD MDM: CPT

## 2025-04-27 PROCEDURE — 2580000003 HC RX 258: Performed by: PHYSICIAN ASSISTANT

## 2025-04-27 PROCEDURE — 80053 COMPREHEN METABOLIC PANEL: CPT

## 2025-04-27 PROCEDURE — 96374 THER/PROPH/DIAG INJ IV PUSH: CPT

## 2025-04-27 PROCEDURE — 85025 COMPLETE CBC W/AUTO DIFF WBC: CPT

## 2025-04-27 PROCEDURE — 83690 ASSAY OF LIPASE: CPT

## 2025-04-27 RX ORDER — ONDANSETRON 4 MG/1
4 TABLET, ORALLY DISINTEGRATING ORAL
Status: DISCONTINUED | OUTPATIENT
Start: 2025-04-27 | End: 2025-04-27

## 2025-04-27 RX ORDER — ONDANSETRON 2 MG/ML
4 INJECTION INTRAMUSCULAR; INTRAVENOUS
Status: COMPLETED | OUTPATIENT
Start: 2025-04-27 | End: 2025-04-27

## 2025-04-27 RX ORDER — 0.9 % SODIUM CHLORIDE 0.9 %
1000 INTRAVENOUS SOLUTION INTRAVENOUS
Status: COMPLETED | OUTPATIENT
Start: 2025-04-27 | End: 2025-04-27

## 2025-04-27 RX ADMIN — ONDANSETRON 4 MG: 2 INJECTION, SOLUTION INTRAMUSCULAR; INTRAVENOUS at 21:27

## 2025-04-27 RX ADMIN — SODIUM CHLORIDE 1000 ML: 0.9 INJECTION, SOLUTION INTRAVENOUS at 21:28

## 2025-04-27 ASSESSMENT — PAIN DESCRIPTION - DESCRIPTORS: DESCRIPTORS: ACHING

## 2025-04-27 ASSESSMENT — PAIN - FUNCTIONAL ASSESSMENT
PAIN_FUNCTIONAL_ASSESSMENT: NONE - DENIES PAIN
PAIN_FUNCTIONAL_ASSESSMENT: 0-10

## 2025-04-27 ASSESSMENT — PAIN DESCRIPTION - ORIENTATION: ORIENTATION: RIGHT

## 2025-04-27 ASSESSMENT — PAIN DESCRIPTION - PAIN TYPE: TYPE: ACUTE PAIN

## 2025-04-27 ASSESSMENT — PAIN DESCRIPTION - LOCATION: LOCATION: KNEE

## 2025-04-27 ASSESSMENT — LIFESTYLE VARIABLES
HOW MANY STANDARD DRINKS CONTAINING ALCOHOL DO YOU HAVE ON A TYPICAL DAY: 1 OR 2
HOW OFTEN DO YOU HAVE A DRINK CONTAINING ALCOHOL: 2-4 TIMES A MONTH

## 2025-04-27 ASSESSMENT — PAIN SCALES - GENERAL: PAINLEVEL_OUTOF10: 9

## 2025-04-27 NOTE — ED TRIAGE NOTES
Patient arrives to lobby using crutches, states R knee surgery on Tuesday. C/o inability to eat, drink, take pain pills since surgery; states vomits everything up. States everything gets stuck in throat. States can drink water but vomits after drinking. C/o dizziness, lightheaded, pain.

## 2025-04-28 NOTE — ED PROVIDER NOTES
Emergency Department Provider Note       PCP: None, None   Age: 59 y.o.   Sex: male     DISPOSITION Decision To Discharge 04/27/2025 10:27:56 PM   DISPOSITION CONDITION Stable            ICD-10-CM    1. Nausea and vomiting, unspecified vomiting type  R11.2         Medical Decision Making     Presents here with complaints of nausea and vomiting.  Triage note states oral swelling but it has really irritation of his esophagus secondary to being intubated during orthopedic surgery.  He states he is unable to tolerate any p.o.'s so presented here.  Labs here are reassuring without any acute abnormalities.  He was given Zofran and IV hydration.  He was able to tolerate p.o.'s here multiple times.  Stating that he is now hungry and would like to eat food.  Will discharge patient home and have him follow-up with his orthopedic surgeon.  Will also encourage him to begin Pepcid for possible esophagitis related to recent intubation.     1 acute illness with systemic symptoms.  Over the counter drug management performed.  Shared medical decision making was utilized in creating the patients health plan today.    I independently ordered and reviewed each unique test.     History     Patient is a 59-year-old gentleman.  Presents here with complaints of nausea, vomiting, epigastric pain.  Reports having a patellar tendon repair performed on Tuesday.  Began feeling unwell the next day.  States he is having difficulty swallowing any of his pills.  Has history of esophageal narrowing and was recommended to have stretching performed however this was never done.  Has not been able to take any of his pain medication.  Several episodes of vomiting but primarily dry heaving yesterday.  No chest pain or shortness of breath.  No fevers or chills.    The history is provided by the patient. No  was used.     Physical Exam     Vitals signs and nursing note reviewed:  Vitals:    04/27/25 1935 04/27/25 2130 04/27/25 2145  Patient verified name, , and procedure. Type: 1a; abbreviated assessment per anesthesia guidelines    Labs per anesthesia: none    Instructed pt that they will be notified the day before their procedure by the GI Lab for time of arrival if their procedure is [facility] and Pre-op for [place] cases. Arrival times should be called by 5 pm. If no phone is received the patient should contact their respective hospital. The GI lab telephone number is [phone] and ES Pre-op is [phone]. Follow diet and prep instructions per office including NPO status. If patient has NOT received instructions from office patient is advised to call surgeon office, verbalizes understanding. Bath or shower the night before and the am of surgery with non-moisturizing soap. No lotions, oils, powders, cologne on skin. No make up, eye make up or jewelry. Wear loose fitting comfortable, clean clothing. Must have adult present in building the entire time . Medications for the day of procedure Lipitor, patient to hold none per anesthesia guidelines. The following discharge instructions reviewed with patient: medication given during procedure may cause drowsiness for several hours, therefore, do not drive or operate machinery for remainder of the day. You may not drink alcohol on the day of your procedure, please resume regular diet and activity unless otherwise directed. You may experience abdominal distention for several hours that is relieved by the passage of gas. Contact your physician if you have any of the following: fever or chills, severe abdominal pain or excessive amount of bleeding or a large amount when having a bowel movement.  Occasional specks of blood with bowel movement would not be unusual.

## 2025-04-28 NOTE — DISCHARGE INSTRUCTIONS
Continue to orally rehydrate at home.  Use Afrin, over-the-counter no spray to help with nasal congestion for the next 3 days.  Do not use it for greater than 3 days in a row.  You must take 3 days off after using it for 3 days.    Use Pepcid over-the-counter medication to help with the esophageal discomfort.  Follow-up with your primary care doctor and your surgeon.

## 2025-05-07 ENCOUNTER — OFFICE VISIT (OUTPATIENT)
Dept: ORTHOPEDIC SURGERY | Age: 60
End: 2025-05-07

## 2025-05-07 DIAGNOSIS — S82.091A OTHER CLOSED FRACTURE OF RIGHT PATELLA, INITIAL ENCOUNTER: ICD-10-CM

## 2025-05-07 DIAGNOSIS — S86.891A PATELLAR TENDON AVULSION, RIGHT, INITIAL ENCOUNTER: Primary | ICD-10-CM

## 2025-05-07 PROCEDURE — 99024 POSTOP FOLLOW-UP VISIT: CPT | Performed by: ORTHOPAEDIC SURGERY

## 2025-05-07 NOTE — PROGRESS NOTES
Orthopaedic Trauma Clinic Note    Name: Dash Mohan  YOB: 1965  Gender: male  MRN: [id number]  PCP:    CC: Follow-up right revision patellar tendon repair    History of Present Illness  Dash Mohan is a 59-year-old male who works as a long-.  Sustained injury to his right knee on 3/20/2025 causing failure of remote right patellar tendon repair  - PSH: Remote history of basketball injury and what I suspect was right patellar tendon repair at age 23    4/22/25: Right revision patellar tendon repair    Returns to clinic approximately 2 weeks from right revision patellar tendon repair.  He is accompanied to the clinic by his wife today  Overall doing well since surgery  Taking occasional oxycodone for pain control    He reports severe morning pain disrupting sleep, attributed to leg position during sleep. He is compliant with the brace but avoids bending his knee and stretches across the back seat in vehicles.    ROS/Meds/PSH/PMH/FH/SH:  Pertinent details discussed in HPI    Physical Examination:  General:  Awake and conversive, no distress, well nourished, age-appropriate.  Neurological: A&O x 3, normal coordination and tone.  Psych: Normal mood, affect and behavior. Normal thought content and judgment. Cooperative w/ exam.  Cardiovascular: RRR, + palpable pulses b/l upper extremity  Pulmonary: Chest excursion equal bilaterally, breathing non-labored    Musculoskeletal Exam:  Right lower extremity  - Skin: Incision intact, no erythema or other evidence of infection  - Normal alignment, does have some residual swelling/effusion globally around the knee, no specific focal TTP around the knee  - ROM: Range of motion deferred.  -He is able to hold the knee antigravity   - Sensation: SILT Sa/Garcia/T/SP/DP   - Motor: 5/5 TA/EHL/FHL/GS   - Digits warm, well-perfused, brisk cap refill    I personally reviewed and interpreted:  - 5/7/2025: AP and lateral views of the left knee obtained

## 2025-05-08 ENCOUNTER — TELEPHONE (OUTPATIENT)
Dept: ORTHOPEDIC SURGERY | Age: 60
End: 2025-05-08

## 2025-05-08 NOTE — TELEPHONE ENCOUNTER
He was seen yesterday but his oxycodone was never sent to the pharmacy. Please send to the pharmacy on file.

## 2025-05-09 RX ORDER — OXYCODONE HYDROCHLORIDE 5 MG/1
5 TABLET ORAL EVERY 6 HOURS PRN
Qty: 28 TABLET | Refills: 0 | Status: SHIPPED | OUTPATIENT
Start: 2025-05-09 | End: 2025-05-16

## 2025-05-28 ENCOUNTER — OFFICE VISIT (OUTPATIENT)
Dept: ORTHOPEDIC SURGERY | Age: 60
End: 2025-05-28

## 2025-05-28 DIAGNOSIS — S86.891D PATELLAR TENDON AVULSION, RIGHT, SUBSEQUENT ENCOUNTER: Primary | ICD-10-CM

## 2025-05-28 PROCEDURE — 99024 POSTOP FOLLOW-UP VISIT: CPT | Performed by: PHYSICIAN ASSISTANT

## 2025-05-28 NOTE — PROGRESS NOTES
Orthopaedic Trauma Clinic Note    Name: Dash Mohan  YOB: 1965  Gender: male  MRN: [id number]  PCP:    CC: Follow-up right revision patellar tendon repair    History of Present Illness  Dash Mohan is a 59-year-old male who works as a long-.  Sustained injury to his right knee on 3/20/2025 causing failure of remote right patellar tendon repair  - PSH: Remote history of basketball injury and what I suspect was right patellar tendon repair at age 23    4/22/25: Right revision patellar tendon repair    Returns to clinic approximately 4 weeks from right revision patellar tendon repair.  He is accompanied to the clinic by his wife today  He is doing well. Able to walk in brace. He does not his knee \"giving out.\" When he is ambulating. We have not yet prescribed him any physical therapy.       ROS/Meds/PSH/PMH/FH/SH:  Pertinent details discussed in HPI    Physical Examination:  General:  Awake and conversive, no distress, well nourished, age-appropriate.  Neurological: A&O x 3, normal coordination and tone.  Psych: Normal mood, affect and behavior. Normal thought content and judgment. Cooperative w/ exam.  Cardiovascular: RRR, + palpable pulses b/l upper extremity  Pulmonary: Chest excursion equal bilaterally, breathing non-labored    Musculoskeletal Exam:  Right lower extremity  - Skin: Incision intact, no erythema or other evidence of infection. Staples intact  - Normal alignment, does have some residual swelling globally around the knee, no specific focal TTP around the knee  - ROM: Range of motion deferred.  -He is unable to hold the knee antigravity   - Sensation: SILT Sa/Garcia/T/SP/DP   - Motor: 5/5 TA/EHL/FHL/GS   - Digits warm, well-perfused, brisk cap refill    I personally reviewed and interpreted:  - 5/7/2025: AP and lateral views of the left knee obtained today compared with preoperative films.  These demonstrates slight improvement patella Ulta with repair of inferior

## 2025-08-04 ENCOUNTER — OFFICE VISIT (OUTPATIENT)
Dept: ORTHOPEDIC SURGERY | Age: 60
End: 2025-08-04
Payer: COMMERCIAL

## 2025-08-04 DIAGNOSIS — S86.891D PATELLAR TENDON AVULSION, RIGHT, SUBSEQUENT ENCOUNTER: Primary | ICD-10-CM

## 2025-08-04 PROCEDURE — 99214 OFFICE O/P EST MOD 30 MIN: CPT | Performed by: ORTHOPAEDIC SURGERY

## (undated) DEVICE — COVER,TABLE,HEAVY DUTY,79"X110",STRL: Brand: MEDLINE

## (undated) DEVICE — CONTAINER,SPECIMEN,O.R.STRL,4.5OZ: Brand: MEDLINE

## (undated) DEVICE — 3M™ TEGADERM™ TRANSPARENT FILM DRESSING FRAME STYLE, 1628, 6 IN X 8 IN (15 CM X 20 CM), 10/CT 8CT/CASE: Brand: 3M™ TEGADERM™

## (undated) DEVICE — SUTURE VCRL SZ 0 L27IN ABSRB UD L36MM CP-1 1/2 CIR REV CUT J267H

## (undated) DEVICE — SLING ARM L ABV 13IN DE-ROTATION STRP HOOKS PROVIDE IMMOB

## (undated) DEVICE — LOWER EXTREMITY: Brand: MEDLINE INDUSTRIES, INC.

## (undated) DEVICE — PACK,SHOULDER,DRAPE,POUCH: Brand: MEDLINE

## (undated) DEVICE — YANKAUER,BULB TIP,W/O VENT,RIGID,STERILE: Brand: MEDLINE

## (undated) DEVICE — CURETTE BNE CEM 10IN DISP --

## (undated) DEVICE — 3M™ IOBAN™ 2 ANTIMICROBIAL INCISE DRAPE 6651EZ: Brand: IOBAN™ 2

## (undated) DEVICE — CARDINAL HEALTH FLEXIBLE LIGHT HANDLE COVER: Brand: CARDINAL HEALTH

## (undated) DEVICE — SHOULDER ARTHRO DR BAUMGARTEN: Brand: MEDLINE INDUSTRIES, INC.

## (undated) DEVICE — SPONGE GZ W4XL4IN COT 12 PLY TYP VII WVN C FLD DSGN STERILE

## (undated) DEVICE — TOWEL,OR,DSP,ST,BLUE,STD,4/PK,20PK/CS: Brand: MEDLINE

## (undated) DEVICE — DRAPE,U/SHT,SPLIT,FILM,60X84,STERILE: Brand: MEDLINE

## (undated) DEVICE — POWDER HEMOSTAT GEL 3.0GR -- SURGICEL

## (undated) DEVICE — 1010 S-DRAPE TOWEL DRAPE 10/BX: Brand: STERI-DRAPE™

## (undated) DEVICE — ACUFEX ACCESS POSITIONING KIT: Brand: ACUFEX

## (undated) DEVICE — SHEET, DRAPE, SPLIT, STERILE: Brand: MEDLINE

## (undated) DEVICE — 4-PORT MANIFOLD: Brand: NEPTUNE 2

## (undated) DEVICE — SLING ORTH COMFORTABLE LG 13-15 IN HND STRP HK ULTRASLING II

## (undated) DEVICE — 3M™ TEGADERM™ TRANSPARENT FILM DRESSING FRAME STYLE, 1626W, 4 IN X 4-3/4 IN (10 CM X 12 CM), 50/CT 4CT/CASE: Brand: 3M™ TEGADERM™

## (undated) DEVICE — 3M™ STERI-DRAPE™ U-DRAPE 1015: Brand: STERI-DRAPE™

## (undated) DEVICE — OUTFLOW CASSETTE TUBING, DO NOT USE IF PACKAGE IS DAMAGED: Brand: CROSSFLOW

## (undated) DEVICE — GARMENT,MEDLINE,DVT,INT,CALF,MED, GEN2: Brand: MEDLINE

## (undated) DEVICE — 2000CC GUARDIAN II: Brand: GUARDIAN

## (undated) DEVICE — T-DRAPE,EXTREMITY,STERILE: Brand: MEDLINE

## (undated) DEVICE — DRAPE,TOP,102X53,STERILE: Brand: MEDLINE

## (undated) DEVICE — DRAPE SHT 3 QTR PROXIMA 53X77 --

## (undated) DEVICE — GOWN,REINF,POLY,ECL,PP SLV,XL: Brand: MEDLINE

## (undated) DEVICE — SUTURE VICRYL + SZ 0 L27IN ABSRB UD L36MM CT-1 1/2 CIR VCPP41D

## (undated) DEVICE — ADHESIVE SKIN CLOSURE HI VISC MIC 0.5 CC PREMIERPRO EXOFIN

## (undated) DEVICE — BUTTON SWITCH PENCIL BLADE ELECTRODE, HOLSTER: Brand: EDGE

## (undated) DEVICE — PACK SURG PROC KNEE USER GPS

## (undated) DEVICE — SOLUTION IRRIG 3000ML 0.9% SOD CHL FLX CONT 0797208] ICU MEDICAL INC]

## (undated) DEVICE — NEEDLE HYPO 18GA L1.5IN PNK S STL HUB POLYPR SHLD REG BVL

## (undated) DEVICE — 3M™ IOBAN™ 2 ANTIMICROBIAL INCISE DRAPE 6650EZ: Brand: IOBAN™ 2

## (undated) DEVICE — TUBING, SUCTION, 1/4" X 10', STRAIGHT: Brand: MEDLINE

## (undated) DEVICE — BLADE SHV CUT MENIS AGG + 4MM --

## (undated) DEVICE — STOCKINETTE,IMPERVIOUS,12X48,STERILE: Brand: MEDLINE

## (undated) DEVICE — SYRINGE CATH TIP 50ML

## (undated) DEVICE — COVER,MAYO STAND,STERILE: Brand: MEDLINE

## (undated) DEVICE — INTENDED FOR TISSUE SEPARATION, AND OTHER PROCEDURES THAT REQUIRE A SHARP SURGICAL BLADE TO PUNCTURE OR CUT.: Brand: BARD-PARKER ® STAINLESS STEEL BLADES

## (undated) DEVICE — FIRSTPASS ST SUTURE PASSER, STANDARD: Brand: FIRSTPASS

## (undated) DEVICE — ULTRATAPE SUTURE COBRAID BLUE, 6                                    PER BOX: Brand: ULTRATAPE

## (undated) DEVICE — SUTURE TAPE 2 40 INX1.3 MM X PAT BLK/WHT ORDER MULT OF 12 EA

## (undated) DEVICE — STERILE POLYISOPRENE POWDER-FREE SURGICAL GLOVES WITH EMOLLIENT COATING: Brand: PROTEXIS

## (undated) DEVICE — TIP CLEANR ELECSURG 1.75X1.75 --

## (undated) DEVICE — APPLICATOR MEDICATED 26 CC SOLUTION HI LT ORNG CHLORAPREP

## (undated) DEVICE — TOWEL SURG W16XL26IN BLU NONFENESTRATED NONSTERILE 400 PER CA

## (undated) DEVICE — YANKAUER,FLEXIBLE HANDLE,REGLR CAPACITY: Brand: MEDLINE INDUSTRIES, INC.

## (undated) DEVICE — STRIP,CLOSURE,WOUND,MEDI-STRIP,1/2X4: Brand: MEDLINE

## (undated) DEVICE — SOLUTION IRRIG 1000ML 0.9% SOD CHL USP POUR PLAS BTL

## (undated) DEVICE — SUTURE STRATAFIX SZ 3-0 L30CM NONABSORBABLE UD L19MM FS-2 SXMP2B408

## (undated) DEVICE — DRAPE TWL SURG 16X26IN BLU ORB04] ALLCARE INC]

## (undated) DEVICE — SYR 50ML LR LCK 1ML GRAD NSAF --

## (undated) DEVICE — STERILE POLYISOPRENE POWDER-FREE SURGICAL GLOVES: Brand: PROTEXIS

## (undated) DEVICE — SUTURE MCRYL SZ 2-0 L27IN ABSRB UD CP-1 1 L36MM 1/2 CIR REV Y266H

## (undated) DEVICE — INTENDED FOR TISSUE SEPARATION, AND OTHER PROCEDURES THAT REQUIRE A SHARP SURGICAL BLADE TO PUNCTURE OR CUT.: Brand: BARD-PARKER ® CARBON RIB-BACK BLADES

## (undated) DEVICE — [AGGRESSIVE 6-FLUTE BARREL BUR, ARTHROSCOPIC SHAVER BLADE,  DO NOT RESTERILIZE,  DO NOT USE IF PACKAGE IS DAMAGED,  KEEP DRY,  KEEP AWAY FROM SUNLIGHT]: Brand: FORMULA

## (undated) DEVICE — SURGIFOAM SPNG SZ 12-7

## (undated) DEVICE — WIRE SMOOTH 0.62X9IN K
Type: IMPLANTABLE DEVICE | Site: SHOULDER | Status: NON-FUNCTIONAL
Removed: 2021-08-27

## (undated) DEVICE — SUTURE FIBERWIRE SZ 5 L38IN NONABSORBABLE BLU L48MM 1/2 AR7211

## (undated) DEVICE — WATERPROOF, BACTERIA PROOF DRESSING WITH ABSORBENT SEE THROUGH PAD: Brand: OPSITE POST-OP VISIBLE 20X10CM CTN 20

## (undated) DEVICE — INSTRUMENT KIT SHLDR HEX PIN GPS

## (undated) DEVICE — GLOVE ORANGE PI 7 1/2   MSG9075

## (undated) DEVICE — T-MAX DISPOSABLE FACE MASK 8 PER BOX

## (undated) DEVICE — (D)PREP SKN CHLRAPRP APPL 26ML -- CONVERT TO ITEM 371833

## (undated) DEVICE — SYRINGE, LUER LOCK, 30ML: Brand: MEDLINE

## (undated) DEVICE — TOTAL 1-LAYER TRAY, LATEX FOLEY, 16FR 10: Brand: MEDLINE

## (undated) DEVICE — DRAPE C ARM W/ POLY STRP W42XL72IN FOR MOB XR

## (undated) DEVICE — SMART SLEEVE SURGICAL GOWN, XL, POLYREINFORCED FRONT: Brand: CONVERTORS

## (undated) DEVICE — ABDOMINAL PAD: Brand: DERMACEA

## (undated) DEVICE — NDL SPNE QNCKE 18GX3.5IN LF --

## (undated) DEVICE — ULTRATAPE 2MM BLUE 38 PKG OF 6

## (undated) DEVICE — SUTURE 2 40 INX1.3 MM X PAT BLUE/DK BLUE SUTURETAPE AR7506

## (undated) DEVICE — SUTURE MCRYL SZ 3-0 L27IN ABSRB UD L19MM PS-2 3/8 CIR PRIM Y427H

## (undated) DEVICE — BANDAGE COMPR SELF ADH 5 YDX4 IN TAN STRL PREMIERPRO LF

## (undated) DEVICE — HOOD: Brand: FLYTE

## (undated) DEVICE — WATERPROOF, BACTERIA PROOF DRESSING WITH ABSORBENT SEE THROUGH PAD: Brand: OPSITE POST-OP VISIBLE 15X10CM CTN 20

## (undated) DEVICE — ELECTRODE NDL 2.8IN COAT VALLEYLAB

## (undated) DEVICE — SOFT SILICONE HYDROCELLULAR FOAM DRESSING WITH LOCK AWAY LAYER: Brand: ALLEVYN LIFE XL 21X21 CTN10

## (undated) DEVICE — NEEDLE HYPO 21GA L1.5IN INTRAMUSCULAR S STL LATCH BVL UP

## (undated) DEVICE — SUTURE STRATAFIX SPRL MCRYL + SZ 3-0 L18IN ABSRB UD PS-2 SXMP1B107

## (undated) DEVICE — MASTISOL ADHESIVE LIQ 2/3ML

## (undated) DEVICE — SPONGE LAP 18X18IN STRL -- 5/PK

## (undated) DEVICE — REM POLYHESIVE ADULT PATIENT RETURN ELECTRODE: Brand: VALLEYLAB

## (undated) DEVICE — OSCILLATING SAW BLDE 13X1.4X70MM

## (undated) DEVICE — 3M™ STERI-DRAPE™ INSTRUMENT POUCH 1018: Brand: STERI-DRAPE™

## (undated) DEVICE — Device

## (undated) DEVICE — AMD ANTIMICROBIAL GAUZE SPONGES,12 PLY USP TYPE VII, 0.2% POLYHEXAMETHYLENE BIGUANIDE HCI (PHMB): Brand: CURITY

## (undated) DEVICE — SHOULDER STABILIZATION KIT,                                    DISPOSABLE 12 PER BOX

## (undated) DEVICE — GLOVE SURG SZ 8 L12IN FNGR THK79MIL GRN LTX FREE

## (undated) DEVICE — SUTURE STRATAFIX SYMMETRIC PDS + SZ 1 L18IN ABSRB VLT L48MM SXPP1A400

## (undated) DEVICE — BANDAGE COMPR W6INXL15YD WHT BGE POLY COT WV E HK LOOP CLSR

## (undated) DEVICE — SUTURE FIBERTAPE FIBERWIRE SZ 2-0 30IN NONABSORB BLU AR72377

## (undated) DEVICE — SOLUTION IV 1000ML 0.9% SOD CHL

## (undated) DEVICE — 2108 SERIES SAGITTAL BLADE (18.6 X 0.64 X 61.1MM)

## (undated) DEVICE — SURGICAL PROCEDURE PACK BASIC ST FRANCIS

## (undated) DEVICE — TABLE COVER: Brand: CONVERTORS

## (undated) DEVICE — STOCKINETTE IMPERV 12X48IN STE -- MEDICHOICE

## (undated) DEVICE — SUTURE ETHBND EXCEL SZ 2 L30IN NONABSORBABLE GRN L40MM V-37 MX69G

## (undated) DEVICE — SUTURE ETHBND EXCEL SZ 5 L30IN NONABSORBABLE GRN L40MM V-37 MB66G

## (undated) DEVICE — SUTURE VICRYL SZ 2-0 L18IN ABSRB UD CT-1 L36MM 1/2 CIR J839D

## (undated) DEVICE — SYRINGE MED 30ML STD CLR PLAS LUERLOCK TIP N CTRL DISP

## (undated) DEVICE — INFLOW CASSETTE TUBING, DO NOT USE IF PACKAGE IS DAMAGED: Brand: CROSSFLOW

## (undated) DEVICE — 2.4 MM X 15 INCH DRILL TIP PASSING                                    PIN, STERILE: Brand: ENDOBUTTON

## (undated) DEVICE — 90-S ACCELERATOR, SUCTION PROBE, NON-BENDABLE, MAX CUT LEVEL 11: Brand: SERFAS ENERGY

## (undated) DEVICE — SHEET, ORTHO, SPLIT, STERILE: Brand: MEDLINE